# Patient Record
Sex: MALE | Race: ASIAN | NOT HISPANIC OR LATINO
[De-identification: names, ages, dates, MRNs, and addresses within clinical notes are randomized per-mention and may not be internally consistent; named-entity substitution may affect disease eponyms.]

---

## 2017-01-05 ENCOUNTER — APPOINTMENT (OUTPATIENT)
Dept: CARDIOLOGY | Facility: CLINIC | Age: 72
End: 2017-01-05

## 2017-01-05 ENCOUNTER — NON-APPOINTMENT (OUTPATIENT)
Age: 72
End: 2017-01-05

## 2017-01-05 VITALS
WEIGHT: 176 LBS | SYSTOLIC BLOOD PRESSURE: 134 MMHG | RESPIRATION RATE: 18 BRPM | BODY MASS INDEX: 29.29 KG/M2 | DIASTOLIC BLOOD PRESSURE: 88 MMHG | OXYGEN SATURATION: 97 % | HEART RATE: 102 BPM

## 2017-01-19 ENCOUNTER — NON-APPOINTMENT (OUTPATIENT)
Age: 72
End: 2017-01-19

## 2017-01-19 ENCOUNTER — APPOINTMENT (OUTPATIENT)
Dept: CARDIOLOGY | Facility: CLINIC | Age: 72
End: 2017-01-19

## 2017-01-19 VITALS
WEIGHT: 176 LBS | DIASTOLIC BLOOD PRESSURE: 79 MMHG | HEART RATE: 104 BPM | SYSTOLIC BLOOD PRESSURE: 111 MMHG | OXYGEN SATURATION: 95 % | TEMPERATURE: 98.4 F | BODY MASS INDEX: 29.29 KG/M2 | RESPIRATION RATE: 18 BRPM

## 2017-01-20 ENCOUNTER — NON-APPOINTMENT (OUTPATIENT)
Age: 72
End: 2017-01-20

## 2017-03-02 ENCOUNTER — APPOINTMENT (OUTPATIENT)
Dept: CARDIOLOGY | Facility: CLINIC | Age: 72
End: 2017-03-02

## 2017-03-02 VITALS
SYSTOLIC BLOOD PRESSURE: 150 MMHG | DIASTOLIC BLOOD PRESSURE: 96 MMHG | WEIGHT: 175 LBS | HEART RATE: 96 BPM | RESPIRATION RATE: 17 BRPM | BODY MASS INDEX: 29.12 KG/M2 | TEMPERATURE: 98.6 F | OXYGEN SATURATION: 96 %

## 2017-04-06 ENCOUNTER — APPOINTMENT (OUTPATIENT)
Dept: CARDIOLOGY | Facility: CLINIC | Age: 72
End: 2017-04-06

## 2017-04-06 VITALS
WEIGHT: 177 LBS | DIASTOLIC BLOOD PRESSURE: 90 MMHG | OXYGEN SATURATION: 96 % | RESPIRATION RATE: 18 BRPM | SYSTOLIC BLOOD PRESSURE: 144 MMHG | HEART RATE: 85 BPM | BODY MASS INDEX: 29.45 KG/M2 | TEMPERATURE: 98 F

## 2017-06-08 ENCOUNTER — APPOINTMENT (OUTPATIENT)
Dept: CARDIOLOGY | Facility: CLINIC | Age: 72
End: 2017-06-08

## 2017-06-08 VITALS
TEMPERATURE: 97.9 F | OXYGEN SATURATION: 96 % | HEART RATE: 83 BPM | DIASTOLIC BLOOD PRESSURE: 95 MMHG | SYSTOLIC BLOOD PRESSURE: 144 MMHG | WEIGHT: 175 LBS | BODY MASS INDEX: 29.12 KG/M2 | RESPIRATION RATE: 18 BRPM

## 2017-06-08 RX ORDER — CLOTRIMAZOLE 10 MG/G
1 CREAM TOPICAL
Qty: 45 | Refills: 0 | Status: ACTIVE | COMMUNITY
Start: 2017-03-08

## 2017-06-08 RX ORDER — PANTOPRAZOLE 40 MG/1
40 TABLET, DELAYED RELEASE ORAL
Qty: 30 | Refills: 0 | Status: ACTIVE | COMMUNITY
Start: 2017-01-30

## 2017-08-03 ENCOUNTER — APPOINTMENT (OUTPATIENT)
Dept: CARDIOLOGY | Facility: CLINIC | Age: 72
End: 2017-08-03
Payer: MEDICARE

## 2017-08-03 ENCOUNTER — NON-APPOINTMENT (OUTPATIENT)
Age: 72
End: 2017-08-03

## 2017-08-03 VITALS
HEART RATE: 102 BPM | WEIGHT: 175 LBS | OXYGEN SATURATION: 98 % | DIASTOLIC BLOOD PRESSURE: 92 MMHG | SYSTOLIC BLOOD PRESSURE: 158 MMHG | RESPIRATION RATE: 17 BRPM | TEMPERATURE: 98.2 F | BODY MASS INDEX: 29.12 KG/M2

## 2017-08-03 PROCEDURE — 93000 ELECTROCARDIOGRAM COMPLETE: CPT

## 2017-08-03 PROCEDURE — 99214 OFFICE O/P EST MOD 30 MIN: CPT

## 2017-10-05 ENCOUNTER — NON-APPOINTMENT (OUTPATIENT)
Age: 72
End: 2017-10-05

## 2017-10-05 ENCOUNTER — APPOINTMENT (OUTPATIENT)
Dept: CARDIOLOGY | Facility: CLINIC | Age: 72
End: 2017-10-05
Payer: MEDICARE

## 2017-10-05 VITALS
RESPIRATION RATE: 17 BRPM | BODY MASS INDEX: 29.95 KG/M2 | HEART RATE: 58 BPM | SYSTOLIC BLOOD PRESSURE: 125 MMHG | TEMPERATURE: 98.7 F | WEIGHT: 180 LBS | OXYGEN SATURATION: 96 % | DIASTOLIC BLOOD PRESSURE: 77 MMHG

## 2017-10-05 PROCEDURE — 93000 ELECTROCARDIOGRAM COMPLETE: CPT

## 2017-10-05 PROCEDURE — 99214 OFFICE O/P EST MOD 30 MIN: CPT

## 2018-03-01 ENCOUNTER — APPOINTMENT (OUTPATIENT)
Dept: CARDIOLOGY | Facility: CLINIC | Age: 73
End: 2018-03-01
Payer: MEDICARE

## 2018-03-01 VITALS
TEMPERATURE: 98 F | BODY MASS INDEX: 29.29 KG/M2 | RESPIRATION RATE: 18 BRPM | OXYGEN SATURATION: 96 % | DIASTOLIC BLOOD PRESSURE: 101 MMHG | SYSTOLIC BLOOD PRESSURE: 148 MMHG | HEART RATE: 98 BPM | WEIGHT: 176 LBS

## 2018-03-01 PROCEDURE — 93306 TTE W/DOPPLER COMPLETE: CPT

## 2018-03-01 PROCEDURE — 99215 OFFICE O/P EST HI 40 MIN: CPT

## 2018-03-01 PROCEDURE — 93224 XTRNL ECG REC UP TO 48 HRS: CPT

## 2018-03-22 ENCOUNTER — APPOINTMENT (OUTPATIENT)
Dept: CARDIOLOGY | Facility: CLINIC | Age: 73
End: 2018-03-22
Payer: MEDICARE

## 2018-03-22 VITALS
TEMPERATURE: 98.1 F | SYSTOLIC BLOOD PRESSURE: 144 MMHG | OXYGEN SATURATION: 95 % | HEART RATE: 89 BPM | BODY MASS INDEX: 29.29 KG/M2 | DIASTOLIC BLOOD PRESSURE: 93 MMHG | WEIGHT: 176 LBS | RESPIRATION RATE: 18 BRPM

## 2018-03-22 PROCEDURE — 93225 XTRNL ECG REC<48 HRS REC: CPT

## 2018-03-22 PROCEDURE — 99214 OFFICE O/P EST MOD 30 MIN: CPT

## 2018-03-22 PROCEDURE — 93227 XTRNL ECG REC<48 HR R&I: CPT

## 2018-03-22 RX ORDER — METFORMIN ER 500 MG 500 MG/1
500 TABLET ORAL
Qty: 90 | Refills: 0 | Status: ACTIVE | COMMUNITY
Start: 2018-03-21

## 2018-04-12 ENCOUNTER — APPOINTMENT (OUTPATIENT)
Dept: CARDIOLOGY | Facility: CLINIC | Age: 73
End: 2018-04-12

## 2018-05-03 ENCOUNTER — APPOINTMENT (OUTPATIENT)
Dept: CARDIOLOGY | Facility: CLINIC | Age: 73
End: 2018-05-03
Payer: MEDICARE

## 2018-05-03 VITALS
DIASTOLIC BLOOD PRESSURE: 78 MMHG | RESPIRATION RATE: 18 BRPM | HEART RATE: 94 BPM | WEIGHT: 179 LBS | SYSTOLIC BLOOD PRESSURE: 113 MMHG | OXYGEN SATURATION: 97 % | TEMPERATURE: 98.6 F | BODY MASS INDEX: 29.79 KG/M2

## 2018-05-03 PROCEDURE — 99214 OFFICE O/P EST MOD 30 MIN: CPT

## 2018-05-03 PROCEDURE — 93227 XTRNL ECG REC<48 HR R&I: CPT

## 2018-05-03 PROCEDURE — 93225 XTRNL ECG REC<48 HRS REC: CPT

## 2018-07-12 ENCOUNTER — APPOINTMENT (OUTPATIENT)
Dept: CARDIOLOGY | Facility: CLINIC | Age: 73
End: 2018-07-12
Payer: MEDICARE

## 2018-07-12 ENCOUNTER — NON-APPOINTMENT (OUTPATIENT)
Age: 73
End: 2018-07-12

## 2018-07-12 VITALS
RESPIRATION RATE: 18 BRPM | HEART RATE: 89 BPM | SYSTOLIC BLOOD PRESSURE: 165 MMHG | OXYGEN SATURATION: 96 % | TEMPERATURE: 98 F | DIASTOLIC BLOOD PRESSURE: 92 MMHG

## 2018-07-12 PROCEDURE — 93000 ELECTROCARDIOGRAM COMPLETE: CPT

## 2018-07-12 PROCEDURE — 99214 OFFICE O/P EST MOD 30 MIN: CPT

## 2018-09-13 ENCOUNTER — APPOINTMENT (OUTPATIENT)
Dept: CARDIOLOGY | Facility: CLINIC | Age: 73
End: 2018-09-13
Payer: MEDICARE

## 2018-09-13 ENCOUNTER — NON-APPOINTMENT (OUTPATIENT)
Age: 73
End: 2018-09-13

## 2018-09-13 VITALS
BODY MASS INDEX: 28.79 KG/M2 | HEART RATE: 79 BPM | SYSTOLIC BLOOD PRESSURE: 179 MMHG | RESPIRATION RATE: 18 BRPM | WEIGHT: 173 LBS | OXYGEN SATURATION: 96 % | DIASTOLIC BLOOD PRESSURE: 96 MMHG

## 2018-09-13 PROCEDURE — 99214 OFFICE O/P EST MOD 30 MIN: CPT

## 2018-09-13 PROCEDURE — 93000 ELECTROCARDIOGRAM COMPLETE: CPT

## 2018-09-13 RX ORDER — LOSARTAN POTASSIUM AND HYDROCHLOROTHIAZIDE 12.5; 1 MG/1; MG/1
100-12.5 TABLET ORAL
Qty: 90 | Refills: 0 | Status: DISCONTINUED | COMMUNITY
Start: 2017-03-02 | End: 2018-09-13

## 2018-09-13 RX ORDER — METOPROLOL SUCCINATE 50 MG/1
50 TABLET, EXTENDED RELEASE ORAL DAILY
Qty: 90 | Refills: 2 | Status: ACTIVE | COMMUNITY
Start: 2017-01-05 | End: 1900-01-01

## 2018-09-13 RX ORDER — METOPROLOL SUCCINATE 25 MG/1
25 TABLET, EXTENDED RELEASE ORAL
Qty: 30 | Refills: 0 | Status: DISCONTINUED | COMMUNITY
Start: 2017-01-05 | End: 2018-09-13

## 2018-11-15 ENCOUNTER — APPOINTMENT (OUTPATIENT)
Dept: CARDIOLOGY | Facility: CLINIC | Age: 73
End: 2018-11-15
Payer: MEDICARE

## 2018-11-15 VITALS
BODY MASS INDEX: 29.29 KG/M2 | WEIGHT: 176 LBS | RESPIRATION RATE: 17 BRPM | HEART RATE: 80 BPM | OXYGEN SATURATION: 96 % | DIASTOLIC BLOOD PRESSURE: 84 MMHG | SYSTOLIC BLOOD PRESSURE: 128 MMHG | TEMPERATURE: 98.3 F

## 2018-11-15 PROCEDURE — 93000 ELECTROCARDIOGRAM COMPLETE: CPT

## 2018-11-15 PROCEDURE — 99214 OFFICE O/P EST MOD 30 MIN: CPT

## 2018-11-15 NOTE — HISTORY OF PRESENT ILLNESS
[FreeTextEntry1] : 1. HTN: on medications, compliant with medications. \par 2. Hyperlipidemia: on statin, no muscle pain is noted. \par 3. AFib: diagnosed in 4/2016. He initially had LVEF 35% but after rate control, his LVEF was normal on last visit.\par He is compliant with medications. Palpitations mild but improved.\par ET is good. No CP.

## 2018-11-15 NOTE — PHYSICAL EXAM
[General Appearance - Well Developed] : well developed [Normal Appearance] : normal appearance [Well Groomed] : well groomed [General Appearance - Well Nourished] : well nourished [No Deformities] : no deformities [General Appearance - In No Acute Distress] : no acute distress [Normal Conjunctiva] : the conjunctiva exhibited no abnormalities [Eyelids - No Xanthelasma] : the eyelids demonstrated no xanthelasmas [Normal Oral Mucosa] : normal oral mucosa [No Oral Pallor] : no oral pallor [No Oral Cyanosis] : no oral cyanosis [Normal Jugular Venous A Waves Present] : normal jugular venous A waves present [Normal Jugular Venous V Waves Present] : normal jugular venous V waves present [No Jugular Venous Odom A Waves] : no jugular venous odom A waves [Respiration, Rhythm And Depth] : normal respiratory rhythm and effort [Exaggerated Use Of Accessory Muscles For Inspiration] : no accessory muscle use [Auscultation Breath Sounds / Voice Sounds] : lungs were clear to auscultation bilaterally [FreeTextEntry1] : irreg irreg S1 S2  3/6 GAVI DANETTEB [Abdomen Soft] : soft [Abdomen Tenderness] : non-tender [Abdomen Mass (___ Cm)] : no abdominal mass palpated [Abnormal Walk] : normal gait [Nail Clubbing] : no clubbing of the fingernails [Cyanosis, Localized] : no localized cyanosis [Petechial Hemorrhages (___cm)] : no petechial hemorrhages [Skin Color & Pigmentation] : normal skin color and pigmentation [] : no rash [No Venous Stasis] : no venous stasis [Skin Lesions] : no skin lesions [No Skin Ulcers] : no skin ulcer [No Xanthoma] : no  xanthoma was observed [Oriented To Time, Place, And Person] : oriented to person, place, and time [Affect] : the affect was normal [Mood] : the mood was normal [No Anxiety] : not feeling anxious

## 2018-11-15 NOTE — REVIEW OF SYSTEMS
[Shortness Of Breath] : shortness of breath [Dyspnea on exertion] : not dyspnea during exertion [Chest Pain] : no chest pain [Lower Ext Edema] : no extremity edema [Palpitations] : palpitations [Negative] : Heme/Lymph

## 2018-11-15 NOTE — DISCUSSION/SUMMARY
[FreeTextEntry1] : 73 M HTN hyperlipidemia AF for f/u.\par Continue meds for HTN.\par Continue statin.\par Continue AC and BB.

## 2018-11-15 NOTE — REASON FOR VISIT
[Follow-Up - Clinic] : a clinic follow-up of [Atrial Fibrillation] : atrial fibrillation [Hyperlipidemia] : hyperlipidemia [Hypertension] : hypertension [FreeTextEntry1] : 73 M HTN hyperlipidemia AFib with palpitations.

## 2019-02-14 ENCOUNTER — APPOINTMENT (OUTPATIENT)
Dept: CARDIOLOGY | Facility: CLINIC | Age: 74
End: 2019-02-14
Payer: MEDICARE

## 2019-02-14 VITALS
HEART RATE: 72 BPM | TEMPERATURE: 98.2 F | OXYGEN SATURATION: 98 % | SYSTOLIC BLOOD PRESSURE: 139 MMHG | RESPIRATION RATE: 18 BRPM | DIASTOLIC BLOOD PRESSURE: 91 MMHG

## 2019-02-14 PROCEDURE — 93306 TTE W/DOPPLER COMPLETE: CPT

## 2019-02-14 PROCEDURE — 93224 XTRNL ECG REC UP TO 48 HRS: CPT

## 2019-02-14 PROCEDURE — 99215 OFFICE O/P EST HI 40 MIN: CPT

## 2019-02-14 PROCEDURE — 93224 XTRNL ECG REC UP TO 48 HRS: CPT | Mod: 59

## 2019-02-14 PROCEDURE — 93000 ELECTROCARDIOGRAM COMPLETE: CPT | Mod: 59

## 2019-02-14 NOTE — REASON FOR VISIT
[Follow-Up - Clinic] : a clinic follow-up of [Atrial Fibrillation] : atrial fibrillation [Hyperlipidemia] : hyperlipidemia [Hypertension] : hypertension [Dyspnea] : dyspnea [Palpitations] : palpitations [FreeTextEntry1] : 73 M HTN hyperlipidemia AFib with palpitations. \par

## 2019-02-14 NOTE — HISTORY OF PRESENT ILLNESS
[FreeTextEntry1] : 1. HTN: on medications, BP control improved.\par 2. Hyperlipidemia: on statin, no muscle pain is noted. LIpid profile is followed by PMD.\par 3. AFib: diagnosed in 4/2016. He initially had LVEF 35% but after rate control, his LVEF was normal on last visit.\par He returns wto clinic with worsening SOB over 2 months. SOB is exertional, relieved with rest and increasing in severity with mild leg edema. It occurs daily and limits his ET. He also has noted increasing palpitations with exertion. He denies CP. ET is limited by the SOB.

## 2019-02-14 NOTE — DISCUSSION/SUMMARY
[FreeTextEntry1] : 73 M HTN hyperlipidemia CAD s/p PCI AF with SOB and palpitations.\par CHECK ECHOCARDIOGRAM.\par CHECK HOLTER MONITOR.\par CHECK NST TO ASSESS PERFUSION (limited ET).\par Continue meds for HTN and hyperlipidemia.\par COndition deteriorating, see me after tests.

## 2019-02-21 ENCOUNTER — NON-APPOINTMENT (OUTPATIENT)
Age: 74
End: 2019-02-21

## 2019-03-07 ENCOUNTER — APPOINTMENT (OUTPATIENT)
Dept: CARDIOLOGY | Facility: CLINIC | Age: 74
End: 2019-03-07
Payer: MEDICARE

## 2019-03-07 VITALS
HEART RATE: 78 BPM | RESPIRATION RATE: 18 BRPM | SYSTOLIC BLOOD PRESSURE: 143 MMHG | TEMPERATURE: 98.6 F | OXYGEN SATURATION: 98 % | DIASTOLIC BLOOD PRESSURE: 95 MMHG | WEIGHT: 178 LBS | BODY MASS INDEX: 29.62 KG/M2

## 2019-03-07 PROCEDURE — 99214 OFFICE O/P EST MOD 30 MIN: CPT

## 2019-03-07 RX ORDER — ESCITALOPRAM OXALATE 5 MG/1
5 TABLET ORAL
Qty: 30 | Refills: 0 | Status: ACTIVE | COMMUNITY
Start: 2018-12-26

## 2019-03-07 RX ORDER — ATORVASTATIN CALCIUM 20 MG/1
20 TABLET, FILM COATED ORAL
Qty: 90 | Refills: 0 | Status: ACTIVE | COMMUNITY
Start: 2018-06-25

## 2019-03-07 RX ORDER — PRAZOSIN HYDROCHLORIDE 1 MG/1
1 CAPSULE ORAL
Qty: 30 | Refills: 0 | Status: ACTIVE | COMMUNITY
Start: 2018-12-26

## 2019-03-07 RX ORDER — HYDROCORTISONE AND ACETIC ACID OTIC 20.75; 10.375 MG/ML; MG/ML
1-2 SOLUTION AURICULAR (OTIC)
Qty: 10 | Refills: 0 | Status: ACTIVE | COMMUNITY
Start: 2019-01-24

## 2019-03-07 NOTE — HISTORY OF PRESENT ILLNESS
[FreeTextEntry1] : 1. HTN: on medications, BP control improved.\par 2. Hyperlipidemia: on statin, no muscle pain is noted. LIpid profile is followed by PMD.\par 3. AFib: diagnosed in 4/2016. He initially had LVEF 35% but after rate control, his LVEF was normal on last visit.\par He returns wto clinic with worsening SOB over 2 months. SOB is exertional, relieved with rest and increasing in severity with mild leg edema. It occurs daily and limits his ET. He also has noted increasing palpitations with exertion. He denies CP. ET is limited by the SOB. \par He had an echo last visit that showed LVEF 60%. He has PVCs and tachybrady on Holter.\par

## 2019-03-07 NOTE — DISCUSSION/SUMMARY
[FreeTextEntry1] : 74 M HTN hyperlipidemia AF with SOB.\par Echo and holter results reviewed.\par CHECK NST TO ASSES PERFUSION.\par Continue meds for HTN.\par Continue statin.\par Continue AC and rate control.

## 2019-03-12 ENCOUNTER — EMERGENCY (EMERGENCY)
Facility: HOSPITAL | Age: 74
LOS: 1 days | Discharge: HOME | End: 2019-03-12
Attending: EMERGENCY MEDICINE | Admitting: EMERGENCY MEDICINE
Payer: MEDICARE

## 2019-03-12 VITALS
HEART RATE: 130 BPM | TEMPERATURE: 97 F | DIASTOLIC BLOOD PRESSURE: 74 MMHG | SYSTOLIC BLOOD PRESSURE: 111 MMHG | OXYGEN SATURATION: 96 % | RESPIRATION RATE: 16 BRPM

## 2019-03-12 PROCEDURE — 99285 EMERGENCY DEPT VISIT HI MDM: CPT | Mod: 25,GC

## 2019-03-12 PROCEDURE — 93010 ELECTROCARDIOGRAM REPORT: CPT

## 2019-03-12 RX ORDER — SODIUM CHLORIDE 9 MG/ML
1000 INJECTION INTRAMUSCULAR; INTRAVENOUS; SUBCUTANEOUS ONCE
Qty: 0 | Refills: 0 | Status: COMPLETED | OUTPATIENT
Start: 2019-03-12 | End: 2019-03-12

## 2019-03-12 RX ORDER — MORPHINE SULFATE 50 MG/1
4 CAPSULE, EXTENDED RELEASE ORAL ONCE
Qty: 0 | Refills: 0 | Status: DISCONTINUED | OUTPATIENT
Start: 2019-03-12 | End: 2019-03-12

## 2019-03-12 RX ADMIN — MORPHINE SULFATE 4 MILLIGRAM(S): 50 CAPSULE, EXTENDED RELEASE ORAL at 23:38

## 2019-03-12 RX ADMIN — SODIUM CHLORIDE 1000 MILLILITER(S): 9 INJECTION INTRAMUSCULAR; INTRAVENOUS; SUBCUTANEOUS at 23:52

## 2019-03-12 NOTE — ED ADULT TRIAGE NOTE - CHIEF COMPLAINT QUOTE
As per Son he as Kidney stones...pain started on left side yesterday has blood in urine....he saw PMD 1 month ago told he has a 1 cm stone. Pt appears very uncomfortable.

## 2019-03-12 NOTE — ED PROVIDER NOTE - PROGRESS NOTE DETAILS
Alonso: s/o to Dr Whitmore to f/u labs and ct and vs.  stable.  working dx kidney stone r/o renal impairment.  likely will need uro if large stone with hx of lithotrypsy and time frame CT showed that pt had passed the stone, BMP showed mild PAIGE, Pt was given 2 L of IVF. Pt was reassessed, symptoms have improved, UA is unremarkable. Pt is stable and will be discharged to follow up outpatient with his urologist for repeat BMP and further management.

## 2019-03-12 NOTE — ED PROVIDER NOTE - OBJECTIVE STATEMENT
74 yr old male with hx of afib on xarelto, HTN, HLD, DM, kidneys tone of left x 3 requiring lithotripsy/stent presents to ed c/o left flank pain  worsening x 1 day with hematuria and dysuria.  dx with stone 1 month ago and told 1cm  stone.  no fever, no n/v, no cp. pt feels abd fullness. took motirn at 7p.

## 2019-03-12 NOTE — ED PROVIDER NOTE - NSFOLLOWUPINSTRUCTIONS_ED_ALL_ED_FT
Please, follow up with your urologist for repeat blood work to evaluate kidney function and further management.   Please take over the counter pain medications, such as Motrin or Tylenol, as needed.

## 2019-03-13 VITALS
RESPIRATION RATE: 18 BRPM | OXYGEN SATURATION: 100 % | DIASTOLIC BLOOD PRESSURE: 92 MMHG | SYSTOLIC BLOOD PRESSURE: 145 MMHG | HEART RATE: 75 BPM | TEMPERATURE: 96 F

## 2019-03-13 LAB
ALBUMIN SERPL ELPH-MCNC: 4 G/DL — SIGNIFICANT CHANGE UP (ref 3.3–5)
ALP SERPL-CCNC: 60 U/L — SIGNIFICANT CHANGE UP (ref 40–120)
ALT FLD-CCNC: 11 U/L — SIGNIFICANT CHANGE UP (ref 4–41)
ANION GAP SERPL CALC-SCNC: 16 MMO/L — HIGH (ref 7–14)
APPEARANCE UR: SIGNIFICANT CHANGE UP
APTT BLD: 38.9 SEC — HIGH (ref 27.5–36.3)
AST SERPL-CCNC: 16 U/L — SIGNIFICANT CHANGE UP (ref 4–40)
BACTERIA # UR AUTO: NEGATIVE — SIGNIFICANT CHANGE UP
BASE EXCESS BLDV CALC-SCNC: -1.4 MMOL/L — SIGNIFICANT CHANGE UP
BASOPHILS # BLD AUTO: 0.05 K/UL — SIGNIFICANT CHANGE UP (ref 0–0.2)
BASOPHILS NFR BLD AUTO: 0.4 % — SIGNIFICANT CHANGE UP (ref 0–2)
BILIRUB SERPL-MCNC: 1 MG/DL — SIGNIFICANT CHANGE UP (ref 0.2–1.2)
BILIRUB UR-MCNC: NEGATIVE — SIGNIFICANT CHANGE UP
BLD GP AB SCN SERPL QL: NEGATIVE — SIGNIFICANT CHANGE UP
BLOOD GAS VENOUS - CREATININE: 1.31 MG/DL — HIGH (ref 0.5–1.3)
BLOOD UR QL VISUAL: HIGH
BUN SERPL-MCNC: 21 MG/DL — SIGNIFICANT CHANGE UP (ref 7–23)
CALCIUM SERPL-MCNC: 9.8 MG/DL — SIGNIFICANT CHANGE UP (ref 8.4–10.5)
CHLORIDE BLDV-SCNC: 108 MMOL/L — SIGNIFICANT CHANGE UP (ref 96–108)
CHLORIDE SERPL-SCNC: 100 MMOL/L — SIGNIFICANT CHANGE UP (ref 98–107)
CO2 SERPL-SCNC: 20 MMOL/L — LOW (ref 22–31)
COLOR SPEC: SIGNIFICANT CHANGE UP
CREAT SERPL-MCNC: 1.46 MG/DL — HIGH (ref 0.5–1.3)
EOSINOPHIL # BLD AUTO: 0.07 K/UL — SIGNIFICANT CHANGE UP (ref 0–0.5)
EOSINOPHIL NFR BLD AUTO: 0.6 % — SIGNIFICANT CHANGE UP (ref 0–6)
GAS PNL BLDV: 132 MMOL/L — LOW (ref 136–146)
GLUCOSE BLDV-MCNC: 129 — HIGH (ref 70–99)
GLUCOSE SERPL-MCNC: 132 MG/DL — HIGH (ref 70–99)
GLUCOSE UR-MCNC: NEGATIVE — SIGNIFICANT CHANGE UP
HCO3 BLDV-SCNC: 23 MMOL/L — SIGNIFICANT CHANGE UP (ref 20–27)
HCT VFR BLD CALC: 46.5 % — SIGNIFICANT CHANGE UP (ref 39–50)
HCT VFR BLDV CALC: 47.3 % — SIGNIFICANT CHANGE UP (ref 39–51)
HGB BLD-MCNC: 15.3 G/DL — SIGNIFICANT CHANGE UP (ref 13–17)
HGB BLDV-MCNC: 15.5 G/DL — SIGNIFICANT CHANGE UP (ref 13–17)
HYALINE CASTS # UR AUTO: SIGNIFICANT CHANGE UP
IMM GRANULOCYTES NFR BLD AUTO: 0.3 % — SIGNIFICANT CHANGE UP (ref 0–1.5)
INR BLD: 2.16 — HIGH (ref 0.88–1.17)
KETONES UR-MCNC: NEGATIVE — SIGNIFICANT CHANGE UP
LACTATE BLDV-MCNC: 1.8 MMOL/L — SIGNIFICANT CHANGE UP (ref 0.5–2)
LEUKOCYTE ESTERASE UR-ACNC: NEGATIVE — SIGNIFICANT CHANGE UP
LYMPHOCYTES # BLD AUTO: 1.37 K/UL — SIGNIFICANT CHANGE UP (ref 1–3.3)
LYMPHOCYTES # BLD AUTO: 12 % — LOW (ref 13–44)
MCHC RBC-ENTMCNC: 31.4 PG — SIGNIFICANT CHANGE UP (ref 27–34)
MCHC RBC-ENTMCNC: 32.9 % — SIGNIFICANT CHANGE UP (ref 32–36)
MCV RBC AUTO: 95.3 FL — SIGNIFICANT CHANGE UP (ref 80–100)
MONOCYTES # BLD AUTO: 1.16 K/UL — HIGH (ref 0–0.9)
MONOCYTES NFR BLD AUTO: 10.2 % — SIGNIFICANT CHANGE UP (ref 2–14)
NEUTROPHILS # BLD AUTO: 8.73 K/UL — HIGH (ref 1.8–7.4)
NEUTROPHILS NFR BLD AUTO: 76.5 % — SIGNIFICANT CHANGE UP (ref 43–77)
NITRITE UR-MCNC: NEGATIVE — SIGNIFICANT CHANGE UP
NRBC # FLD: 0 K/UL — LOW (ref 25–125)
PCO2 BLDV: 39 MMHG — LOW (ref 41–51)
PH BLDV: 7.39 PH — SIGNIFICANT CHANGE UP (ref 7.32–7.43)
PH UR: 6 — SIGNIFICANT CHANGE UP (ref 5–8)
PLATELET # BLD AUTO: 280 K/UL — SIGNIFICANT CHANGE UP (ref 150–400)
PMV BLD: 10.6 FL — SIGNIFICANT CHANGE UP (ref 7–13)
PO2 BLDV: 64 MMHG — HIGH (ref 35–40)
POTASSIUM BLDV-SCNC: 3.8 MMOL/L — SIGNIFICANT CHANGE UP (ref 3.4–4.5)
POTASSIUM SERPL-MCNC: 4.1 MMOL/L — SIGNIFICANT CHANGE UP (ref 3.5–5.3)
POTASSIUM SERPL-SCNC: 4.1 MMOL/L — SIGNIFICANT CHANGE UP (ref 3.5–5.3)
PROT SERPL-MCNC: 7.1 G/DL — SIGNIFICANT CHANGE UP (ref 6–8.3)
PROT UR-MCNC: 70 — SIGNIFICANT CHANGE UP
PROTHROM AB SERPL-ACNC: 24.6 SEC — HIGH (ref 9.8–13.1)
RBC # BLD: 4.88 M/UL — SIGNIFICANT CHANGE UP (ref 4.2–5.8)
RBC # FLD: 12.3 % — SIGNIFICANT CHANGE UP (ref 10.3–14.5)
RBC CASTS # UR COMP ASSIST: >50 — HIGH (ref 0–?)
RH IG SCN BLD-IMP: POSITIVE — SIGNIFICANT CHANGE UP
SAO2 % BLDV: 91.6 % — HIGH (ref 60–85)
SODIUM SERPL-SCNC: 136 MMOL/L — SIGNIFICANT CHANGE UP (ref 135–145)
SP GR SPEC: 1.01 — SIGNIFICANT CHANGE UP (ref 1–1.04)
SQUAMOUS # UR AUTO: SIGNIFICANT CHANGE UP
UROBILINOGEN FLD QL: NORMAL — SIGNIFICANT CHANGE UP
WBC # BLD: 11.41 K/UL — HIGH (ref 3.8–10.5)
WBC # FLD AUTO: 11.41 K/UL — HIGH (ref 3.8–10.5)
WBC UR QL: HIGH (ref 0–?)

## 2019-03-13 PROCEDURE — 74176 CT ABD & PELVIS W/O CONTRAST: CPT | Mod: 26,GC

## 2019-03-13 RX ORDER — KETOROLAC TROMETHAMINE 30 MG/ML
30 SYRINGE (ML) INJECTION ONCE
Qty: 0 | Refills: 0 | Status: DISCONTINUED | OUTPATIENT
Start: 2019-03-13 | End: 2019-03-13

## 2019-03-13 RX ADMIN — Medication 30 MILLIGRAM(S): at 03:32

## 2019-03-13 RX ADMIN — SODIUM CHLORIDE 1000 MILLILITER(S): 9 INJECTION INTRAMUSCULAR; INTRAVENOUS; SUBCUTANEOUS at 00:57

## 2019-03-13 RX ADMIN — MORPHINE SULFATE 4 MILLIGRAM(S): 50 CAPSULE, EXTENDED RELEASE ORAL at 00:00

## 2019-03-13 NOTE — ED ADULT NURSE NOTE - OBJECTIVE STATEMENT
pt received to room B. Turkish speaking, requests for son translate. c/o left pain since yesterday with hematuria/dysuria. hx kidney stones in the past. denies any fever/chills, n/v, dizziness, chest pain. appears uncomfortable on arrival to room. labs sent, iv accessed, medicated as ordered. awaits results in nad with family at bedside.

## 2019-03-13 NOTE — ED ADULT NURSE NOTE - RELATIONSHIP TO PATIENT
son Trilobed Flap Text: The defect edges were debeveled with a #15 scalpel blade.  Given the location of the defect and the proximity to free margins a trilobed flap was deemed most appropriate.  Using a sterile surgical marker, an appropriate trilobed flap drawn around the defect.    The area thus outlined was incised deep to adipose tissue with a #15 scalpel blade.  The skin margins were undermined to an appropriate distance in all directions utilizing iris scissors.

## 2019-03-14 LAB
BACTERIA UR CULT: SIGNIFICANT CHANGE UP
SPECIMEN SOURCE: SIGNIFICANT CHANGE UP

## 2019-04-26 ENCOUNTER — APPOINTMENT (OUTPATIENT)
Dept: CARDIOLOGY | Facility: CLINIC | Age: 74
End: 2019-04-26

## 2019-05-31 ENCOUNTER — EMERGENCY (EMERGENCY)
Facility: HOSPITAL | Age: 74
LOS: 1 days | Discharge: ANOTHER HOSPITAL | End: 2019-05-31
Attending: EMERGENCY MEDICINE | Admitting: EMERGENCY MEDICINE
Payer: MEDICARE

## 2019-05-31 ENCOUNTER — INPATIENT (INPATIENT)
Facility: HOSPITAL | Age: 74
LOS: 4 days | DRG: 64 | End: 2019-06-05
Attending: PSYCHIATRY & NEUROLOGY | Admitting: PSYCHIATRY & NEUROLOGY
Payer: MEDICARE

## 2019-05-31 VITALS
RESPIRATION RATE: 20 BRPM | OXYGEN SATURATION: 96 % | DIASTOLIC BLOOD PRESSURE: 117 MMHG | TEMPERATURE: 98 F | SYSTOLIC BLOOD PRESSURE: 158 MMHG | HEART RATE: 86 BPM

## 2019-05-31 VITALS
TEMPERATURE: 98 F | OXYGEN SATURATION: 98 % | SYSTOLIC BLOOD PRESSURE: 149 MMHG | HEART RATE: 96 BPM | RESPIRATION RATE: 20 BRPM | DIASTOLIC BLOOD PRESSURE: 81 MMHG

## 2019-05-31 VITALS
TEMPERATURE: 98 F | SYSTOLIC BLOOD PRESSURE: 148 MMHG | RESPIRATION RATE: 16 BRPM | HEART RATE: 85 BPM | OXYGEN SATURATION: 100 % | DIASTOLIC BLOOD PRESSURE: 97 MMHG

## 2019-05-31 DIAGNOSIS — I63.511 CEREBRAL INFARCTION DUE TO UNSPECIFIED OCCLUSION OR STENOSIS OF RIGHT MIDDLE CEREBRAL ARTERY: ICD-10-CM

## 2019-05-31 PROBLEM — I10 ESSENTIAL (PRIMARY) HYPERTENSION: Chronic | Status: ACTIVE | Noted: 2019-03-13

## 2019-05-31 PROBLEM — E11.9 TYPE 2 DIABETES MELLITUS WITHOUT COMPLICATIONS: Chronic | Status: ACTIVE | Noted: 2019-03-13

## 2019-05-31 PROBLEM — I48.91 UNSPECIFIED ATRIAL FIBRILLATION: Chronic | Status: ACTIVE | Noted: 2019-03-13

## 2019-05-31 LAB
ALBUMIN SERPL ELPH-MCNC: 3.8 G/DL — SIGNIFICANT CHANGE UP (ref 3.3–5)
ALBUMIN SERPL ELPH-MCNC: 4 G/DL — SIGNIFICANT CHANGE UP (ref 3.3–5)
ALP SERPL-CCNC: 56 U/L — SIGNIFICANT CHANGE UP (ref 40–120)
ALP SERPL-CCNC: 60 U/L — SIGNIFICANT CHANGE UP (ref 40–120)
ALT FLD-CCNC: 12 U/L — SIGNIFICANT CHANGE UP (ref 4–41)
ALT FLD-CCNC: 16 U/L — SIGNIFICANT CHANGE UP (ref 10–45)
ANION GAP SERPL CALC-SCNC: 10 MMOL/L — SIGNIFICANT CHANGE UP (ref 5–17)
ANION GAP SERPL CALC-SCNC: 11 MMO/L — SIGNIFICANT CHANGE UP (ref 7–14)
ANION GAP SERPL CALC-SCNC: 14 MMOL/L — SIGNIFICANT CHANGE UP (ref 5–17)
APTT BLD: 26.4 SEC — LOW (ref 27.5–36.3)
APTT BLD: 28.4 SEC — SIGNIFICANT CHANGE UP (ref 27.5–36.3)
AST SERPL-CCNC: 22 U/L — SIGNIFICANT CHANGE UP (ref 10–40)
AST SERPL-CCNC: 24 U/L — SIGNIFICANT CHANGE UP (ref 4–40)
BASE EXCESS BLDV CALC-SCNC: 1.4 MMOL/L — SIGNIFICANT CHANGE UP
BASOPHILS # BLD AUTO: 0 K/UL — SIGNIFICANT CHANGE UP (ref 0–0.2)
BASOPHILS # BLD AUTO: 0.04 K/UL — SIGNIFICANT CHANGE UP (ref 0–0.2)
BASOPHILS NFR BLD AUTO: 0.3 % — SIGNIFICANT CHANGE UP (ref 0–2)
BASOPHILS NFR BLD AUTO: 0.5 % — SIGNIFICANT CHANGE UP (ref 0–2)
BILIRUB SERPL-MCNC: 1.3 MG/DL — HIGH (ref 0.2–1.2)
BILIRUB SERPL-MCNC: 1.6 MG/DL — HIGH (ref 0.2–1.2)
BLD GP AB SCN SERPL QL: NEGATIVE — SIGNIFICANT CHANGE UP
BLD GP AB SCN SERPL QL: NEGATIVE — SIGNIFICANT CHANGE UP
BLOOD GAS VENOUS - CREATININE: 0.97 MG/DL — SIGNIFICANT CHANGE UP (ref 0.5–1.3)
BUN SERPL-MCNC: 16 MG/DL — SIGNIFICANT CHANGE UP (ref 7–23)
BUN SERPL-MCNC: 19 MG/DL — SIGNIFICANT CHANGE UP (ref 7–23)
BUN SERPL-MCNC: 20 MG/DL — SIGNIFICANT CHANGE UP (ref 7–23)
CALCIUM SERPL-MCNC: 8.8 MG/DL — SIGNIFICANT CHANGE UP (ref 8.4–10.5)
CALCIUM SERPL-MCNC: 9.2 MG/DL — SIGNIFICANT CHANGE UP (ref 8.4–10.5)
CALCIUM SERPL-MCNC: 9.5 MG/DL — SIGNIFICANT CHANGE UP (ref 8.4–10.5)
CHLORIDE BLDV-SCNC: 105 MMOL/L — SIGNIFICANT CHANGE UP (ref 96–108)
CHLORIDE SERPL-SCNC: 100 MMOL/L — SIGNIFICANT CHANGE UP (ref 96–108)
CHLORIDE SERPL-SCNC: 101 MMOL/L — SIGNIFICANT CHANGE UP (ref 96–108)
CHLORIDE SERPL-SCNC: 104 MMOL/L — SIGNIFICANT CHANGE UP (ref 98–107)
CK MB CFR SERPL CALC: 1.5 NG/ML — SIGNIFICANT CHANGE UP (ref 0–6.7)
CK SERPL-CCNC: 98 U/L — SIGNIFICANT CHANGE UP (ref 30–200)
CO2 SERPL-SCNC: 23 MMOL/L — SIGNIFICANT CHANGE UP (ref 22–31)
CO2 SERPL-SCNC: 23 MMOL/L — SIGNIFICANT CHANGE UP (ref 22–31)
CO2 SERPL-SCNC: 24 MMOL/L — SIGNIFICANT CHANGE UP (ref 22–31)
CREAT SERPL-MCNC: 0.89 MG/DL — SIGNIFICANT CHANGE UP (ref 0.5–1.3)
CREAT SERPL-MCNC: 0.96 MG/DL — SIGNIFICANT CHANGE UP (ref 0.5–1.3)
CREAT SERPL-MCNC: 0.96 MG/DL — SIGNIFICANT CHANGE UP (ref 0.5–1.3)
EOSINOPHIL # BLD AUTO: 0 K/UL — SIGNIFICANT CHANGE UP (ref 0–0.5)
EOSINOPHIL # BLD AUTO: 0.04 K/UL — SIGNIFICANT CHANGE UP (ref 0–0.5)
EOSINOPHIL NFR BLD AUTO: 0.4 % — SIGNIFICANT CHANGE UP (ref 0–6)
EOSINOPHIL NFR BLD AUTO: 0.5 % — SIGNIFICANT CHANGE UP (ref 0–6)
GAS PNL BLDV: 138 MMOL/L — SIGNIFICANT CHANGE UP (ref 136–146)
GLUCOSE BLDV-MCNC: 175 MG/DL — HIGH (ref 70–99)
GLUCOSE SERPL-MCNC: 157 MG/DL — HIGH (ref 70–99)
GLUCOSE SERPL-MCNC: 159 MG/DL — HIGH (ref 70–99)
GLUCOSE SERPL-MCNC: 176 MG/DL — HIGH (ref 70–99)
HCO3 BLDV-SCNC: 25 MMOL/L — SIGNIFICANT CHANGE UP (ref 20–27)
HCT VFR BLD CALC: 45.4 % — SIGNIFICANT CHANGE UP (ref 39–50)
HCT VFR BLD CALC: 47.1 % — SIGNIFICANT CHANGE UP (ref 39–50)
HCT VFR BLDV CALC: 47.6 % — SIGNIFICANT CHANGE UP (ref 39–51)
HGB BLD-MCNC: 15.1 G/DL — SIGNIFICANT CHANGE UP (ref 13–17)
HGB BLD-MCNC: 16 G/DL — SIGNIFICANT CHANGE UP (ref 13–17)
HGB BLDV-MCNC: 15.5 G/DL — SIGNIFICANT CHANGE UP (ref 13–17)
IMM GRANULOCYTES NFR BLD AUTO: 0.3 % — SIGNIFICANT CHANGE UP (ref 0–1.5)
INR BLD: 0.95 RATIO — SIGNIFICANT CHANGE UP (ref 0.88–1.16)
INR BLD: 0.97 — SIGNIFICANT CHANGE UP (ref 0.88–1.17)
LACTATE BLDV-MCNC: 2.2 MMOL/L — HIGH (ref 0.5–2)
LYMPHOCYTES # BLD AUTO: 1.04 K/UL — SIGNIFICANT CHANGE UP (ref 1–3.3)
LYMPHOCYTES # BLD AUTO: 1.1 K/UL — SIGNIFICANT CHANGE UP (ref 1–3.3)
LYMPHOCYTES # BLD AUTO: 10.5 % — LOW (ref 13–44)
LYMPHOCYTES # BLD AUTO: 13.8 % — SIGNIFICANT CHANGE UP (ref 13–44)
MCHC RBC-ENTMCNC: 30.9 PG — SIGNIFICANT CHANGE UP (ref 27–34)
MCHC RBC-ENTMCNC: 32.4 PG — SIGNIFICANT CHANGE UP (ref 27–34)
MCHC RBC-ENTMCNC: 33.3 % — SIGNIFICANT CHANGE UP (ref 32–36)
MCHC RBC-ENTMCNC: 34 GM/DL — SIGNIFICANT CHANGE UP (ref 32–36)
MCV RBC AUTO: 92.8 FL — SIGNIFICANT CHANGE UP (ref 80–100)
MCV RBC AUTO: 95.4 FL — SIGNIFICANT CHANGE UP (ref 80–100)
MONOCYTES # BLD AUTO: 0.48 K/UL — SIGNIFICANT CHANGE UP (ref 0–0.9)
MONOCYTES # BLD AUTO: 0.6 K/UL — SIGNIFICANT CHANGE UP (ref 0–0.9)
MONOCYTES NFR BLD AUTO: 5.3 % — SIGNIFICANT CHANGE UP (ref 2–14)
MONOCYTES NFR BLD AUTO: 6.4 % — SIGNIFICANT CHANGE UP (ref 2–14)
NEUTROPHILS # BLD AUTO: 5.91 K/UL — SIGNIFICANT CHANGE UP (ref 1.8–7.4)
NEUTROPHILS # BLD AUTO: 8.7 K/UL — HIGH (ref 1.8–7.4)
NEUTROPHILS NFR BLD AUTO: 78.5 % — HIGH (ref 43–77)
NEUTROPHILS NFR BLD AUTO: 83.4 % — HIGH (ref 43–77)
NRBC # FLD: 0 K/UL — SIGNIFICANT CHANGE UP (ref 0–0)
PCO2 BLDV: 45 MMHG — SIGNIFICANT CHANGE UP (ref 41–51)
PH BLDV: 7.38 PH — SIGNIFICANT CHANGE UP (ref 7.32–7.43)
PLATELET # BLD AUTO: 232 K/UL — SIGNIFICANT CHANGE UP (ref 150–400)
PLATELET # BLD AUTO: 250 K/UL — SIGNIFICANT CHANGE UP (ref 150–400)
PMV BLD: 10.1 FL — SIGNIFICANT CHANGE UP (ref 7–13)
PO2 BLDV: 55 MMHG — HIGH (ref 35–40)
POTASSIUM BLDV-SCNC: 4.4 MMOL/L — SIGNIFICANT CHANGE UP (ref 3.4–4.5)
POTASSIUM SERPL-MCNC: 4.4 MMOL/L — SIGNIFICANT CHANGE UP (ref 3.5–5.3)
POTASSIUM SERPL-MCNC: 4.4 MMOL/L — SIGNIFICANT CHANGE UP (ref 3.5–5.3)
POTASSIUM SERPL-MCNC: 5.2 MMOL/L — SIGNIFICANT CHANGE UP (ref 3.5–5.3)
POTASSIUM SERPL-SCNC: 4.4 MMOL/L — SIGNIFICANT CHANGE UP (ref 3.5–5.3)
POTASSIUM SERPL-SCNC: 4.4 MMOL/L — SIGNIFICANT CHANGE UP (ref 3.5–5.3)
POTASSIUM SERPL-SCNC: 5.2 MMOL/L — SIGNIFICANT CHANGE UP (ref 3.5–5.3)
PROT SERPL-MCNC: 6.9 G/DL — SIGNIFICANT CHANGE UP (ref 6–8.3)
PROT SERPL-MCNC: 7.2 G/DL — SIGNIFICANT CHANGE UP (ref 6–8.3)
PROTHROM AB SERPL-ACNC: 10.8 SEC — SIGNIFICANT CHANGE UP (ref 10–12.9)
PROTHROM AB SERPL-ACNC: 11.1 SEC — SIGNIFICANT CHANGE UP (ref 9.8–13.1)
RBC # BLD: 4.89 M/UL — SIGNIFICANT CHANGE UP (ref 4.2–5.8)
RBC # BLD: 4.94 M/UL — SIGNIFICANT CHANGE UP (ref 4.2–5.8)
RBC # FLD: 11.4 % — SIGNIFICANT CHANGE UP (ref 10.3–14.5)
RBC # FLD: 12.4 % — SIGNIFICANT CHANGE UP (ref 10.3–14.5)
RH IG SCN BLD-IMP: POSITIVE — SIGNIFICANT CHANGE UP
RH IG SCN BLD-IMP: POSITIVE — SIGNIFICANT CHANGE UP
SAO2 % BLDV: 87.4 % — HIGH (ref 60–85)
SODIUM SERPL-SCNC: 134 MMOL/L — LOW (ref 135–145)
SODIUM SERPL-SCNC: 138 MMOL/L — SIGNIFICANT CHANGE UP (ref 135–145)
SODIUM SERPL-SCNC: 138 MMOL/L — SIGNIFICANT CHANGE UP (ref 135–145)
TROPONIN T, HIGH SENSITIVITY RESULT: 8 NG/L — SIGNIFICANT CHANGE UP (ref 0–51)
WBC # BLD: 10.4 K/UL — SIGNIFICANT CHANGE UP (ref 3.8–10.5)
WBC # BLD: 7.53 K/UL — SIGNIFICANT CHANGE UP (ref 3.8–10.5)
WBC # FLD AUTO: 10.4 K/UL — SIGNIFICANT CHANGE UP (ref 3.8–10.5)
WBC # FLD AUTO: 7.53 K/UL — SIGNIFICANT CHANGE UP (ref 3.8–10.5)

## 2019-05-31 PROCEDURE — 0042T: CPT

## 2019-05-31 PROCEDURE — 93010 ELECTROCARDIOGRAM REPORT: CPT

## 2019-05-31 PROCEDURE — 99285 EMERGENCY DEPT VISIT HI MDM: CPT | Mod: 25

## 2019-05-31 PROCEDURE — 70450 CT HEAD/BRAIN W/O DYE: CPT | Mod: 26

## 2019-05-31 PROCEDURE — 70496 CT ANGIOGRAPHY HEAD: CPT | Mod: 26

## 2019-05-31 PROCEDURE — 99223 1ST HOSP IP/OBS HIGH 75: CPT

## 2019-05-31 PROCEDURE — 99291 CRITICAL CARE FIRST HOUR: CPT

## 2019-05-31 PROCEDURE — 70498 CT ANGIOGRAPHY NECK: CPT | Mod: 26

## 2019-05-31 PROCEDURE — 70450 CT HEAD/BRAIN W/O DYE: CPT | Mod: 26,77

## 2019-05-31 RX ORDER — SODIUM CHLORIDE 5 G/100ML
1000 INJECTION, SOLUTION INTRAVENOUS
Refills: 0 | Status: DISCONTINUED | OUTPATIENT
Start: 2019-05-31 | End: 2019-06-01

## 2019-05-31 RX ORDER — SODIUM CHLORIDE 9 MG/ML
1000 INJECTION INTRAMUSCULAR; INTRAVENOUS; SUBCUTANEOUS
Refills: 0 | Status: DISCONTINUED | OUTPATIENT
Start: 2019-05-31 | End: 2019-05-31

## 2019-05-31 RX ORDER — ONDANSETRON 8 MG/1
4 TABLET, FILM COATED ORAL ONCE
Refills: 0 | Status: COMPLETED | OUTPATIENT
Start: 2019-05-31 | End: 2019-05-31

## 2019-05-31 RX ORDER — ACETAMINOPHEN 500 MG
1000 TABLET ORAL ONCE
Refills: 0 | Status: COMPLETED | OUTPATIENT
Start: 2019-05-31 | End: 2019-05-31

## 2019-05-31 RX ORDER — ENOXAPARIN SODIUM 100 MG/ML
40 INJECTION SUBCUTANEOUS EVERY 24 HOURS
Refills: 0 | Status: DISCONTINUED | OUTPATIENT
Start: 2019-05-31 | End: 2019-06-02

## 2019-05-31 RX ORDER — ATORVASTATIN CALCIUM 80 MG/1
80 TABLET, FILM COATED ORAL AT BEDTIME
Refills: 0 | Status: DISCONTINUED | OUTPATIENT
Start: 2019-05-31 | End: 2019-06-02

## 2019-05-31 RX ORDER — ASPIRIN/CALCIUM CARB/MAGNESIUM 324 MG
300 TABLET ORAL DAILY
Refills: 0 | Status: DISCONTINUED | OUTPATIENT
Start: 2019-05-31 | End: 2019-06-01

## 2019-05-31 RX ADMIN — Medication 1000 MILLIGRAM(S): at 21:15

## 2019-05-31 RX ADMIN — Medication 300 MILLIGRAM(S): at 13:34

## 2019-05-31 RX ADMIN — SODIUM CHLORIDE 50 MILLILITER(S): 5 INJECTION, SOLUTION INTRAVENOUS at 11:23

## 2019-05-31 RX ADMIN — ONDANSETRON 4 MILLIGRAM(S): 8 TABLET, FILM COATED ORAL at 14:43

## 2019-05-31 RX ADMIN — ONDANSETRON 4 MILLIGRAM(S): 8 TABLET, FILM COATED ORAL at 22:41

## 2019-05-31 RX ADMIN — ONDANSETRON 4 MILLIGRAM(S): 8 TABLET, FILM COATED ORAL at 09:40

## 2019-05-31 RX ADMIN — ENOXAPARIN SODIUM 40 MILLIGRAM(S): 100 INJECTION SUBCUTANEOUS at 21:53

## 2019-05-31 RX ADMIN — Medication 400 MILLIGRAM(S): at 20:34

## 2019-05-31 RX ADMIN — ONDANSETRON 4 MILLIGRAM(S): 8 TABLET, FILM COATED ORAL at 18:07

## 2019-05-31 NOTE — STROKE CODE NOTE - NIH STROKE SCALE: 11. EXTINCTION AND INATTENTION, QM
(1) Visual, tactile, auditory, spatial, or personal inattention or extinction to bilateral simultaneous stimulation in one of the sensory modalities
(0) No abnormality

## 2019-05-31 NOTE — ED PROVIDER NOTE - CLINICAL SUMMARY MEDICAL DECISION MAKING FREE TEXT BOX
Resident: code stroke, left sided weakness, last known normal 1:30am. ICA occulsion on CT, will transfer for neuro intervention. Not TPA candidate due to outside window and Xarelto.

## 2019-05-31 NOTE — ED ADULT NURSE NOTE - NSIMPLEMENTINTERV_GEN_ALL_ED
Implemented All Fall with Harm Risk Interventions:  [place] to call system. Call bell, personal items and telephone within reach. Instruct patient to call for assistance. Room bathroom lighting operational. Non-slip footwear when patient is off stretcher. Physically safe environment: no spills, clutter or unnecessary equipment. Stretcher in lowest position, wheels locked, appropriate side rails in place. Provide visual cue, wrist band, yellow gown, etc. Monitor gait and stability. Monitor for mental status changes and reorient to person, place, and time. Review medications for side effects contributing to fall risk. Reinforce activity limits and safety measures with patient and family. Provide visual clues: red socks.

## 2019-05-31 NOTE — ED PROVIDER NOTE - PROGRESS NOTE DETAILS
Resident: per neuro, ICA occlusion, will be transferred to [facility] for endovascular treatment, accepted by Dr. Libman, will be transferred to emergency department. Resident: per neuro, CT shows ICA occlusion vs possible dissection, will be transferred to [facility] for endovascular treatment, accepted by Dr. Libman, will be transferred to emergency department.

## 2019-05-31 NOTE — H&P ADULT - ATTENDING COMMENTS
74-year-old right-handed [place] gentleman first evaluated at [facility] on 5/31/19 with left hemiparesis. His son provided the history and also translated. He has a history of atrial fibrillation, on Xarelto. On 5/31/19, in the morning, he had difficulty getting out of bed due to severe left hemiparesis. ROS otherwise negative. Exam. Alert with probable at least mild left babak-neglect; anosognosia for left hemiparesis; Asomatognosia; decreased blink to threat on left; dense left gaze palsy; moderate left central facial palsy; severe dysarthria-unintelligible; left side-no movement; sensory-mild-moderately decreased grimace to noxious stimuli on left; gait not tested; remainder of neurologic exam was nonfocal. CT head (5/31/19) to my eye showed  subtle low density consistent with an acute, large right MCA infarct involving the frontal, temporal and basal ganglia regions. There was a chronic, small right MCA infarct in the parietal region. CTA neck (5/31/19) to my eye showed some tapering of the right ICA a few centimeters distal to the bulb, possibly consistent with dissection versus atherosclerosis.  CTA head (5/31/19) to my eye showed a right M1 occlusion. Impression. On 5/31/19 he developed acute left hemiparesis, due to a large right MCA infarct, most likely due to cardioembolism related to atrial fibrillation, with right carotid dissection or atherosclerosis being less likely. Plan. MRI brain; MRA neck and head/MRI neck T1-weighted fat sat; neuro checks for possible hemicraniectomy, although at his age, the risks and benefits of hemicraniectomy may favor survival but probably not good functional outcome; aspirin for now; in 10-14 days, depending on clinical course, may change aspirin to Eliquis; PT/OT.

## 2019-05-31 NOTE — STROKE CODE NOTE - NIH STROKE SCALE: 1A. LEVEL OF CONSCIOUSNESS, QM
(0) Alert; keenly responsive
(1) Not alert; but arousable by minor stimulation to obey, answer, or respond

## 2019-05-31 NOTE — ED PROVIDER NOTE - OBJECTIVE STATEMENT
74y M PMH afib on xarelto, HTN, HLD, DM  transferred from [facility] for Right MCA occlusion, with LEFT dense paresis.. Last known normal 1:30am, prior to going to bed. Woke up at 6am with left sided weakness, slurred speech. Code Stoke called upon arrival to [facility] for possible clot retrieval.  denies fevers, chills, falls, headache, chest pain, sob, abd pain, nausea, vomiting, diarrhea, or other issues.

## 2019-05-31 NOTE — ED PROVIDER NOTE - CLINICAL SUMMARY MEDICAL DECISION MAKING FREE TEXT BOX
male with known R MCA occlusion, transferred for possible clot retrieval. code stroke called upon arrival to mobilize resources. pending ct perfusion for disposition. patient is critically ill with stable vitals, protecting airway at this time.

## 2019-05-31 NOTE — SWALLOW BEDSIDE ASSESSMENT ADULT - ADDITIONAL RECOMMENDATIONS
- Will re-evaluate on MONDAY to determine candidacy for swallow study   -please order formal speech and language evaluation - Will re-evaluate on MONDAY to determine candidacy for swallow study   -please order formal speech and language evaluation  -provide oral care

## 2019-05-31 NOTE — SWALLOW BEDSIDE ASSESSMENT ADULT - SLP GENERAL OBSERVATIONS
ROBERTO Sarabia stated that pt unable to utilize  phone for evaluation 2/2 aphasia and dysarthria. Stated that son present and has been providing translation. D/W pt and family upon arrival and they wished for son, tone, to translate. Pt Nepali speaking, full independent PTA, on regular diet PTA. Wife endorsed GERD PTA ("heartburn") on Zantac. Stated that she occasionally noted cough with solids for the past 3 years, not progressive, no w/u. At present: Pt with dense L HP, facial weakness, limited eye opening (but awake- ? eye opening apraxia as pt attempting to hold lid open with hand), R side preference, evidence of L hemisensory loss. Speech severely impaired- marked articulatory distortions, unable to produce phonemes at times (but able to grossly repeat vowels/diadokinetic phonemes.)Rate very slow, poor respiratory support (MPT 3 sec). Wet/harsh/weak quality. Phoneme repetition (? dysfluency) Poorly intelligible. Able to inconsistently answer very simple personally relevant  questions (oriented x2, able to name family), able to follow some simple commands and ID some simple body parts. HR to 126 (has AFIB). RR 21, Sat 96% RN No stated that pt unable to utilize  phone for evaluation 2/2 aphasia and dysarthria. Stated that son present and has been providing translation. D/W pt and family upon arrival and they wished for son, tone, to translate. Pt Faroese speaking, full independent PTA, on regular diet PTA. Wife endorsed GERD PTA ("heartburn") on Zantac. Stated that she occasionally noted cough with solids for the past 3 years, not progressive, no w/u. At present: Pt with dense L HP, facial weakness, limited eye opening (but awake- ? eye opening apraxia as pt attempting to hold lid open with hand), R side preference, evidence of L hemisensory loss. Speech severely impaired- marked articulatory distortions, unable to produce phonemes at times (but able to grossly repeat vowels/diadokinetic phonemes.)Rate very slow, poor respiratory support (MPT 3 sec). Wet/harsh/weak quality. Phoneme repetition (? dysfluency) Poorly intelligible. Able to inconsistently answer very simple personally relevant  questions (oriented x2, able to name family), able to follow some simple commands and ID some simple body parts.

## 2019-05-31 NOTE — STROKE CODE NOTE - NIH STROKE SCALE: 4. FACIAL PALSY, QM
(2) Partial paralysis (total or near-total paralysis of lower face)
(2) Partial paralysis (total or near-total paralysis of lower face)

## 2019-05-31 NOTE — ED PROVIDER NOTE - ATTENDING CONTRIBUTION TO CARE
Dr. Arora: 73 yo male with afib on Xarelto, HTN, HLD, DM, last known baseline normal 1:30 AM today (when pt went ot bed), in ED with slurred speech and left body weakness noted upon awakening at 6 AM today.  Code stroke called on pt arrival.  No falls.  Pt without pain complaints at time of my eval.  On exam pt unwell appearing but in NAD, heart irregular, lungs CTAB, abd NTND, extremities without swelling.  +right facial droop including eyelid.  +0/5 strength in left UE/LE, 5/5 in right UE/LE.  +significant slurring of speech/aphasia.  Imaging showing ICA occlusion vs. dissection, neuro aware, recommending emergent transfer to [facility] for possible endovascular intervention.  No tPA or other medication to be given in ED. Dr. Arora: 73 yo male with afib on Xarelto, HTN, HLD, DM, last known baseline normal 1:30 AM today (when pt went ot bed), in ED with slurred speech and left body weakness noted upon awakening at 6 AM today.  Code stroke called on pt arrival.  No falls.  Pt without pain complaints at time of my eval.  On exam pt unwell appearing but in NAD, heart irregular, lungs CTAB, abd NTND, extremities without swelling.  +right facial droop including eyelid.  +0/5 strength in left UE/LE, 5/5 in right UE/LE.  +significant slurring of speech/aphasia.  Imaging showing occlusion vs. dissection, neuro aware, recommending emergent transfer to [facility] for possible endovascular intervention.  No tPA or other medication to be given in ED.

## 2019-05-31 NOTE — ED ADULT TRIAGE NOTE - CHIEF COMPLAINT QUOTE
Pt arrives to ED via EMS with last seen normal at 01:00 and awoke at 06:30 with left sided weakness and right sided facial droop with slurred speech.  ROCKY called to [facility] for assessment.  sq=[phone] .  Pt on Xarelto for irregular heart beat.  CODE STROKE called by ROCKY.  18g iv placed to right wrist by RN.  Pt taken to CT.

## 2019-05-31 NOTE — ED PROVIDER NOTE - CRITICAL CARE PROVIDED
consultation with other physicians/direct patient care (not related to procedure)/documentation/consult w/ pt's family directly relating to pts condition/interpretation of diagnostic studies/additional history taking

## 2019-05-31 NOTE — STROKE CODE NOTE - NIH STROKE SCALE: 8. SENSORY, QM
(2) Severe to total sensory loss; patient is not aware of being touched in the face, arm, and leg
(2) Severe to total sensory loss; patient is not aware of being touched in the face, arm, and leg

## 2019-05-31 NOTE — ED ADULT NURSE NOTE - OBJECTIVE STATEMENT
74 year old male BIBA with a pmh of HTN, HLD, DM, afib, on xarelto  transferred from [facility] for Right MCA occlusion, with LEFT dense paresis. patient was last known normal at 1:30am, prior to going to bed. Woke up at 6am with left sided weakness, slurred speech. Code Stoke called upon arrival to [facility] for possible clot retrieval. patient lethargic, arousable to voice and touch, mostly Latvian speaking, moaning and groaning nodding and shaking head to answers. noted with partial paralysis to face. unable to move left upper and lower ext. no drift to RUE and RLE. no TPA given at [facility].

## 2019-05-31 NOTE — ED PROVIDER NOTE - OBJECTIVE STATEMENT
Resident: Serbian speaking male accompanied by wife and son, family translating. 74y M PMH afib on xarelto, HTN, HLD, DM presents with left sided weakness. Last known normal 1:30am, prior to going to bed. Woke up at 6am with left sided weakness, slurred speech. Code Stoke called. Resident: Latvian speaking male accompanied by wife and son, family translating. 74y M PMH afib on xarelto, HTN, HLD, DM presents with left sided weakness. Last known normal 1:30am, prior to going to bed.  Woke up at 6am with left sided weakness, slurred speech. Code Stoke called.  No known trauma.

## 2019-05-31 NOTE — CONSULT NOTE ADULT - SUBJECTIVE AND OBJECTIVE BOX
HPI:    Patient is a 74 year old right handed Sami male with a history of Afib on Xarelto, HTN, HLD who presents to the ED at [facility] as a code stroke. Patient was last seen to be normal at 1 am on 5/31 when he finished watching TV with his son and went up stairs to bed. His wife woke up at 6:40 am today and tried to wake him and found him to be weak on the left side and with severely slurred speech so he was brought to the ED. His wife did not notice him get out of bed or going to the bathroom between 1 am - 6:40 am. He has no history of stroke or TIA.    MEDICATIONS  (STANDING):  ondansetron Injectable 4 milliGRAM(s) IV Push once    MEDICATIONS  (PRN):    PAST MEDICAL & SURGICAL HISTORY:  HTN (hypertension)  No significant past surgical history    FAMILY HISTORY:    Allergies    No Known Allergies    Intolerances    SHx - No smoking history; occasional alcohol use        Vital Signs Last 24 Hrs  T(C): 36.9 (31 May 2019 09:39), Max: 36.9 (31 May 2019 09:39)  T(F): 98.4 (31 May 2019 09:39), Max: 98.4 (31 May 2019 09:39)  HR: 86 (31 May 2019 09:39) (86 - 86)  BP: 158/117 (31 May 2019 09:39) (158/117 - 158/117)  BP(mean): --  RR: 20 (31 May 2019 09:39) (20 - 20)  SpO2: 96% (31 May 2019 09:39) (96% - 96%)    Awake, alert  Follows simple commands in Sami  Severe dysarthria  Eye opening apaxia  L facial droop   Strength:   LUE 0/5, LLE 0/5, RUE/RLE full strength   Pronator drift: LUE               Dysmetria: unable to assess on the left   Sensation: decreased sensation to light touch and pain on the left   Gait: not assessed    05-31    134<L>  |  101  |  19  ----------------------------<  159<H>  5.2   |  23  |  0.96    Ca    9.2      31 May 2019 09:13    TPro  7.2  /  Alb  4.0  /  TBili  1.6<H>  /  DBili  x   /  AST  22  /  ALT  16  /  AlkPhos  60  05-31                            16.0   10.4  )-----------( 250      ( 31 May 2019 09:13 )             47.1       Radiology

## 2019-05-31 NOTE — ED ADULT NURSE NOTE - NSIMPLEMENTINTERV_GEN_ALL_ED
Implemented All Fall with Harm Risk Interventions:  [place] to call system. Call bell, personal items and telephone within reach. Instruct patient to call for assistance. Room bathroom lighting operational. Non-slip footwear when patient is off stretcher. Physically safe environment: no spills, clutter or unnecessary equipment. Stretcher in lowest position, wheels locked, appropriate side rails in place. Provide visual cue, wrist band, yellow gown, etc. Monitor gait and stability. Monitor for mental status changes and reorient to person, place, and time. Review medications for side effects contributing to fall risk. Reinforce activity limits and safety measures with patient and family. Provide visual clues: red socks.

## 2019-05-31 NOTE — SWALLOW BEDSIDE ASSESSMENT ADULT - SWALLOW EVAL: SECRETION MANAGEMENT
fine upper airway congestion - multiple cued weak coughs were elicited upon command in an attempt to clear/problems swallowing secretions

## 2019-05-31 NOTE — H&P ADULT - MENTAL STATUS
alert awake following command, severe dysarthria, unable to check language component due to language barriers

## 2019-05-31 NOTE — SWALLOW BEDSIDE ASSESSMENT ADULT - ASR SWALLOW LINGUAL MOBILITY
reduced depression and strength, rate of motion/impaired left lateral movement/impaired right lateral movement/impaired protrusion/impaired anterior elevation

## 2019-05-31 NOTE — ED PROVIDER NOTE - CARE PLAN
Principal Discharge DX:	Cerebrovascular accident (CVA) due to occlusion of right middle cerebral artery  Secondary Diagnosis:	Weakness

## 2019-05-31 NOTE — ED PROVIDER NOTE - NEUROLOGICAL GAIT WEIGHT BEARING
[Weight management counseling provided] : Weight management [Healthy eating counseling provided] : healthy eating [Activity counseling provided] : activity [Behavioral health counseling provided] : behavioral health  [None] : None [Good understanding] : Patient has a good understanding of lifestyle changes and the steps needed to achieve self management goals UNABLE

## 2019-05-31 NOTE — H&P ADULT - CRANIAL NERVE
PERRL, rightward eye deviation - unable to cross midline, hemianopia on left side, left facial droop

## 2019-05-31 NOTE — ED ADULT NURSE NOTE - OBJECTIVE STATEMENT
Pt received to TR DANIEL from CT scan awake, lethargic and breathing with ease on nasal cannula. Labs sent by facilitator RN and 18G noted to the right wrist with no signs of redness or swelling. As per family, pt condition remains unchanged and has slurred speech, expressive aphasia and left side weakness noted. Afib noted on cardiac monitor and report given to Transfer center. Pt awaiting EMS to [place].

## 2019-05-31 NOTE — SWALLOW BEDSIDE ASSESSMENT ADULT - PHARYNGEAL PHASE
Delayed pharyngeal swallow/Decreased laryngeal elevation/Multiple swallows/Wet vocal quality post oral intake

## 2019-05-31 NOTE — CONSULT NOTE ADULT - ASSESSMENT
Patient is a 74 year old right handed English male with a history of Afib on Xarelto, HTN, HLD who presents to the ED at [facility] as a code stroke. Patient was last seen to be normal at 1 am on 5/31 when he finished watching TV with his son and went up stairs to bed. His wife woke up at 6:40 am today and tried to wake him and found him to be weak on the left side and with severely slurred speech so he was brought to the ED. His wife did not notice him get out of bed or going to the bathroom between 1 am - 6:40 am. He has no history of stroke or TIA.    Upon arrival to [facility], patient was found to have left hemiplegia, loss of sensation to the left, left facial droop, severe dysarthria.    NIHSS 19. MRS 0. He was not a candidate for tPA due to being outside the appropriate window and due to taking Xarelto. He was found on vessel imaging to have possible M1 occlusion as well as R ICA occlusion, so he was transferred to [facility]. Wife and son are at bedside and updated with plan.     Recommendations:  Transfer to [facility] for possible endovascular intervention, CT perfusion scan

## 2019-05-31 NOTE — H&P ADULT - ASSESSMENT
Patient is a 74 year old right handed Mongolian male with a history of Afib on Xarelto, HTN, HLD who presents to the ED for eval of left sided weakness. Patient was later transfer to [facility] for further management and possible intervention. Patient was last seen to be normal at 1 am on 5/31 when he finished watching TV with his son and went up stairs to bed. His wife woke up at 6:40 am today and tried to wake him and found him to be weak on the left side and with severely slurred speech so he was brought to the ED. His wife did not notice him get out of bed or going to the bathroom between 1 am - 6:40 am. He has no history of stroke or TIA. Patient is complaint with Xarelto. NIHSS is 17 and mrs was 0. tpa was not given as patient was out of window and was on Xarelto. Patient at [facility] found to have right proximal M1 occlusion. Patient had CT head and CT perfusion done at [facility], which showed early signs of RMCA infarction and core infarct of 152 cc. Patient is not candidate for intervention as patient has large core infarct.     Impression     Acute right MCA infarction secondary to cardioembolism in setting of A.fibb     Plan     Permissive HTN x 24hrs goal 220/120   MRI brain w/o cont  MRA neck w/ cont fat sat if secondary discussion with radiologist is suggestive of dissection   HgA1c  Lipid profile  ASA 81mg, hold Xarelto for 2 weeks due to large infarct size   Atorvastatin 80 mg   TTE  Telemetry monitoring  PT/OT/dysphagia screen   DVT prophylaxis   IV fluids 100 ml/hr

## 2019-05-31 NOTE — ED ADULT NURSE NOTE - CHIEF COMPLAINT QUOTE
Pt arrives to ED via EMS with last seen normal at 01:00 and awoke at 06:30 with left sided weakness and right sided facial droop with slurred speech.  ROCKY called to [facility] for assessment.  fl=084 .  Pt on Xarelto for irregular heart beat.  CODE STROKE called by ROCKY.  18g iv placed to right wrist by RN.  Pt taken to CT.

## 2019-05-31 NOTE — SWALLOW BEDSIDE ASSESSMENT ADULT - SWALLOW EVAL: DIAGNOSIS
Patient presents with: 1) jian-pharyngeal dysphagia with impaired oral management, s/s suggestive of delay in trigger of the swallow reflex/post swallow residue/laryngeal penetration-aspiration on the most conservative p.o. texture 2) Severe dysarthria 3) Aphasia/Cognitive-linguisitic impairment based upon screen.

## 2019-05-31 NOTE — ED PROVIDER NOTE - ATTENDING CONTRIBUTION TO CARE
attending Carlos: Code stroke called on arrival  74yM h/o afib on xarelto, HTN, HLD, DM transferred from OSH for R MCA occlusion, with LEFT dense paresis. LKN 1:30am, awoke 6am with L sided weakness, slurred speech. Will obtain repeat CTH, CT perfusion, neuro eval for possible endovascular intervention, admit

## 2019-05-31 NOTE — H&P ADULT - NSHPLABSRESULTS_GEN_ALL_CORE
16.0   10.4  )-----------( 250      ( 31 May 2019 09:13 )             47.1     PT/INR - ( 31 May 2019 09:13 )   PT: 10.8 sec;   INR: 0.95 ratio         PTT - ( 31 May 2019 09:13 )  PTT:26.4 sec    05-31    134<L>  |  101  |  19  ----------------------------<  159<H>  5.2   |  23  |  0.96    Ca    9.2      31 May 2019 09:13    TPro  7.2  /  Alb  4.0  /  TBili  1.6<H>  /  DBili  x   /  AST  22  /  ALT  16  /  AlkPhos  60  05-31      < from: CT Perfusion w/ Maps w/ IV Cont (05.31.19 @ 09:26) >    There is a subtle large area of lucency in the right frontal temporal   region basal ganglia consistent with an acute infarct in a large portion   of the right middle cerebral artery distribution. There is no hemorrhage.   Increased density to the vascular system is consistent with retained   contrast from the patient's previous CTA. Lucency in the right parietal   cortex is consistent with old ischemia.       CTP:    After the intravenous administration of 50 cc of Omnipaque 300, serial   thin sections were obtained through the brain for the purposes of   evaluating CT perfusion. Data was sent to the Rapid ischemia view   software for postprocessing.     There is a large core infarct measuring 152 ml, delayed mean transit   time 240 ml    CBF<30% volume: 152 ml  T max>6.0s volume: 240 ml  Mismatch volume: 88 ml  Mismatch ratio: 1.6  CBV<34% volume: 139 ml          Impression: Large acute right middle cerebral artery infarct. No   hemorrhage.    < end of copied text >

## 2019-05-31 NOTE — H&P ADULT - HISTORY OF PRESENT ILLNESS
Patient is a 74 year old right handed Frisian male with a history of Afib on Xarelto, HTN, HLD who presents to the ED for eval of left sided weakness. Patient was later transfer to NS for further management and possible intervention. Patient was last seen to be normal at 1 am on 5/31 when he finished watching TV with his son and went up stairs to bed. His wife woke up at 6:40 am today and tried to wake him and found him to be weak on the left side and with severely slurred speech so he was brought to the ED. His wife did not notice him get out of bed or going to the bathroom between 1 am - 6:40 am. He has no history of stroke or TIA. Patient is complaint with Xarelto. NIHSS is 17 and mrs was 0. tpa was not given as patient was out of window and was on Xarelto. Patient at [facility] found to have right proximal M1 occlusion. Patient had CT head and CT perfusion done at [facility], which showed early signs of RMCA infarction and core infarct of 152 cc. Patient is not candidate for intervention as patient has large core infarct. Patient is a 74 year old right handed Croatian male with a history of Afib on Xarelto, HTN, HLD who presents to the ED for eval of left sided weakness. Patient was later transfer to NS for further management and possible intervention. Patient was last seen to be normal at 1 am on 5/31 when he finished watching TV with his son and went up stairs to bed. His wife woke up at 6:40 am today and tried to wake him and found him to be weak on the left side and with severely slurred speech so he was brought to the ED. His wife did not notice him get out of bed or going to the bathroom between 1 am - 6:40 am. He has no history of stroke or TIA. Patient is complaint with Xarelto. NIHSS was 17 at [facility] and was 20 at [facility] and mrs was 0. tpa was not given as patient was out of window and was on Xarelto. Patient at [facility] found to have right proximal M1 occlusion. Patient had CT head and CT perfusion done at [facility], which showed early signs of RMCA infarction and core infarct of 152 cc. Patient is not candidate for intervention as patient has large core infarct.

## 2019-05-31 NOTE — SWALLOW BEDSIDE ASSESSMENT ADULT - ORAL PHASE
Oral transit time noted to be 6  seconds./Stasis in anterior sulcus/Delayed oral transit time/Stasis in lateral sulci/Lingual stasis

## 2019-06-01 ENCOUNTER — OUTPATIENT (OUTPATIENT)
Dept: OUTPATIENT SERVICES | Facility: HOSPITAL | Age: 74
LOS: 1 days | End: 2019-06-01

## 2019-06-01 LAB
ANION GAP SERPL CALC-SCNC: 11 MMOL/L — SIGNIFICANT CHANGE UP (ref 5–17)
ANION GAP SERPL CALC-SCNC: 15 MMOL/L — SIGNIFICANT CHANGE UP (ref 5–17)
APPEARANCE UR: CLEAR — SIGNIFICANT CHANGE UP
BASOPHILS # BLD AUTO: 0.1 K/UL — SIGNIFICANT CHANGE UP (ref 0–0.2)
BASOPHILS NFR BLD AUTO: 0.5 % — SIGNIFICANT CHANGE UP (ref 0–2)
BILIRUB UR-MCNC: NEGATIVE — SIGNIFICANT CHANGE UP
BUN SERPL-MCNC: 15 MG/DL — SIGNIFICANT CHANGE UP (ref 7–23)
BUN SERPL-MCNC: 16 MG/DL — SIGNIFICANT CHANGE UP (ref 7–23)
BUN SERPL-MCNC: 17 MG/DL — SIGNIFICANT CHANGE UP (ref 7–23)
BUN SERPL-MCNC: 17 MG/DL — SIGNIFICANT CHANGE UP (ref 7–23)
CALCIUM SERPL-MCNC: 8.7 MG/DL — SIGNIFICANT CHANGE UP (ref 8.4–10.5)
CALCIUM SERPL-MCNC: 9 MG/DL — SIGNIFICANT CHANGE UP (ref 8.4–10.5)
CALCIUM SERPL-MCNC: 9.1 MG/DL — SIGNIFICANT CHANGE UP (ref 8.4–10.5)
CALCIUM SERPL-MCNC: 9.3 MG/DL — SIGNIFICANT CHANGE UP (ref 8.4–10.5)
CHLORIDE SERPL-SCNC: 102 MMOL/L — SIGNIFICANT CHANGE UP (ref 96–108)
CHLORIDE SERPL-SCNC: 102 MMOL/L — SIGNIFICANT CHANGE UP (ref 96–108)
CHLORIDE SERPL-SCNC: 109 MMOL/L — HIGH (ref 96–108)
CHLORIDE SERPL-SCNC: 110 MMOL/L — HIGH (ref 96–108)
CHOLEST SERPL-MCNC: 144 MG/DL — SIGNIFICANT CHANGE UP (ref 10–199)
CO2 SERPL-SCNC: 19 MMOL/L — LOW (ref 22–31)
CO2 SERPL-SCNC: 20 MMOL/L — LOW (ref 22–31)
CO2 SERPL-SCNC: 21 MMOL/L — LOW (ref 22–31)
CO2 SERPL-SCNC: 22 MMOL/L — SIGNIFICANT CHANGE UP (ref 22–31)
COLOR SPEC: YELLOW — SIGNIFICANT CHANGE UP
CREAT SERPL-MCNC: 0.95 MG/DL — SIGNIFICANT CHANGE UP (ref 0.5–1.3)
CREAT SERPL-MCNC: 0.99 MG/DL — SIGNIFICANT CHANGE UP (ref 0.5–1.3)
CREAT SERPL-MCNC: 1 MG/DL — SIGNIFICANT CHANGE UP (ref 0.5–1.3)
CREAT SERPL-MCNC: 1 MG/DL — SIGNIFICANT CHANGE UP (ref 0.5–1.3)
DIFF PNL FLD: NEGATIVE — SIGNIFICANT CHANGE UP
EOSINOPHIL # BLD AUTO: 0 K/UL — SIGNIFICANT CHANGE UP (ref 0–0.5)
EOSINOPHIL NFR BLD AUTO: 0.1 % — SIGNIFICANT CHANGE UP (ref 0–6)
GLUCOSE BLDC GLUCOMTR-MCNC: 157 MG/DL — HIGH (ref 70–99)
GLUCOSE SERPL-MCNC: 155 MG/DL — HIGH (ref 70–99)
GLUCOSE SERPL-MCNC: 159 MG/DL — HIGH (ref 70–99)
GLUCOSE SERPL-MCNC: 162 MG/DL — HIGH (ref 70–99)
GLUCOSE SERPL-MCNC: 162 MG/DL — HIGH (ref 70–99)
GLUCOSE UR QL: ABNORMAL
HBA1C BLD-MCNC: 7.1 % — HIGH (ref 4–5.6)
HCT VFR BLD CALC: 44 % — SIGNIFICANT CHANGE UP (ref 39–50)
HCT VFR BLD CALC: 48.5 % — SIGNIFICANT CHANGE UP (ref 39–50)
HCV AB S/CO SERPL IA: 0.57 S/CO — SIGNIFICANT CHANGE UP (ref 0–0.99)
HCV AB SERPL-IMP: SIGNIFICANT CHANGE UP
HDLC SERPL-MCNC: 54 MG/DL — SIGNIFICANT CHANGE UP
HGB BLD-MCNC: 15.1 G/DL — SIGNIFICANT CHANGE UP (ref 13–17)
HGB BLD-MCNC: 16.2 G/DL — SIGNIFICANT CHANGE UP (ref 13–17)
KETONES UR-MCNC: ABNORMAL
LACTATE SERPL-SCNC: 2.9 MMOL/L — HIGH (ref 0.7–2)
LEUKOCYTE ESTERASE UR-ACNC: NEGATIVE — SIGNIFICANT CHANGE UP
LIPID PNL WITH DIRECT LDL SERPL: 78 MG/DL — SIGNIFICANT CHANGE UP
LYMPHOCYTES # BLD AUTO: 1.2 K/UL — SIGNIFICANT CHANGE UP (ref 1–3.3)
LYMPHOCYTES # BLD AUTO: 9.4 % — LOW (ref 13–44)
MCHC RBC-ENTMCNC: 32.6 PG — SIGNIFICANT CHANGE UP (ref 27–34)
MCHC RBC-ENTMCNC: 33.1 PG — SIGNIFICANT CHANGE UP (ref 27–34)
MCHC RBC-ENTMCNC: 33.4 GM/DL — SIGNIFICANT CHANGE UP (ref 32–36)
MCHC RBC-ENTMCNC: 34.3 GM/DL — SIGNIFICANT CHANGE UP (ref 32–36)
MCV RBC AUTO: 96.6 FL — SIGNIFICANT CHANGE UP (ref 80–100)
MCV RBC AUTO: 97.7 FL — SIGNIFICANT CHANGE UP (ref 80–100)
MONOCYTES # BLD AUTO: 0.6 K/UL — SIGNIFICANT CHANGE UP (ref 0–0.9)
MONOCYTES NFR BLD AUTO: 4.8 % — SIGNIFICANT CHANGE UP (ref 2–14)
NEUTROPHILS # BLD AUTO: 11 K/UL — HIGH (ref 1.8–7.4)
NEUTROPHILS NFR BLD AUTO: 85.2 % — HIGH (ref 43–77)
NITRITE UR-MCNC: NEGATIVE — SIGNIFICANT CHANGE UP
PH UR: 6.5 — SIGNIFICANT CHANGE UP (ref 5–8)
PLATELET # BLD AUTO: 232 K/UL — SIGNIFICANT CHANGE UP (ref 150–400)
PLATELET # BLD AUTO: 238 K/UL — SIGNIFICANT CHANGE UP (ref 150–400)
POTASSIUM SERPL-MCNC: 4 MMOL/L — SIGNIFICANT CHANGE UP (ref 3.5–5.3)
POTASSIUM SERPL-MCNC: 4.4 MMOL/L — SIGNIFICANT CHANGE UP (ref 3.5–5.3)
POTASSIUM SERPL-MCNC: 4.6 MMOL/L — SIGNIFICANT CHANGE UP (ref 3.5–5.3)
POTASSIUM SERPL-MCNC: 5.3 MMOL/L — SIGNIFICANT CHANGE UP (ref 3.5–5.3)
POTASSIUM SERPL-SCNC: 4 MMOL/L — SIGNIFICANT CHANGE UP (ref 3.5–5.3)
POTASSIUM SERPL-SCNC: 4.4 MMOL/L — SIGNIFICANT CHANGE UP (ref 3.5–5.3)
POTASSIUM SERPL-SCNC: 4.6 MMOL/L — SIGNIFICANT CHANGE UP (ref 3.5–5.3)
POTASSIUM SERPL-SCNC: 5.3 MMOL/L — SIGNIFICANT CHANGE UP (ref 3.5–5.3)
PROT UR-MCNC: ABNORMAL
RAPID RVP RESULT: SIGNIFICANT CHANGE UP
RBC # BLD: 4.55 M/UL — SIGNIFICANT CHANGE UP (ref 4.2–5.8)
RBC # BLD: 4.97 M/UL — SIGNIFICANT CHANGE UP (ref 4.2–5.8)
RBC # FLD: 11.7 % — SIGNIFICANT CHANGE UP (ref 10.3–14.5)
RBC # FLD: 11.8 % — SIGNIFICANT CHANGE UP (ref 10.3–14.5)
SODIUM SERPL-SCNC: 136 MMOL/L — SIGNIFICANT CHANGE UP (ref 135–145)
SODIUM SERPL-SCNC: 139 MMOL/L — SIGNIFICANT CHANGE UP (ref 135–145)
SODIUM SERPL-SCNC: 141 MMOL/L — SIGNIFICANT CHANGE UP (ref 135–145)
SODIUM SERPL-SCNC: 145 MMOL/L — SIGNIFICANT CHANGE UP (ref 135–145)
SP GR SPEC: 1.03 — HIGH (ref 1.01–1.02)
TOTAL CHOLESTEROL/HDL RATIO MEASUREMENT: 2.7 RATIO — LOW (ref 3.4–9.6)
TRIGL SERPL-MCNC: 62 MG/DL — SIGNIFICANT CHANGE UP (ref 10–149)
UROBILINOGEN FLD QL: ABNORMAL
WBC # BLD: 12.1 K/UL — HIGH (ref 3.8–10.5)
WBC # BLD: 12.9 K/UL — HIGH (ref 3.8–10.5)
WBC # FLD AUTO: 12.1 K/UL — HIGH (ref 3.8–10.5)
WBC # FLD AUTO: 12.9 K/UL — HIGH (ref 3.8–10.5)

## 2019-06-01 PROCEDURE — 70551 MRI BRAIN STEM W/O DYE: CPT | Mod: 26

## 2019-06-01 PROCEDURE — 71045 X-RAY EXAM CHEST 1 VIEW: CPT | Mod: 26

## 2019-06-01 PROCEDURE — 71045 X-RAY EXAM CHEST 1 VIEW: CPT | Mod: 26,77

## 2019-06-01 RX ORDER — DEXTROSE 50 % IN WATER 50 %
12.5 SYRINGE (ML) INTRAVENOUS ONCE
Refills: 0 | Status: DISCONTINUED | OUTPATIENT
Start: 2019-06-01 | End: 2019-06-03

## 2019-06-01 RX ORDER — ACETAMINOPHEN 500 MG
1000 TABLET ORAL ONCE
Refills: 0 | Status: COMPLETED | OUTPATIENT
Start: 2019-06-01 | End: 2019-06-01

## 2019-06-01 RX ORDER — ACETAMINOPHEN 500 MG
650 TABLET ORAL EVERY 6 HOURS
Refills: 0 | Status: DISCONTINUED | OUTPATIENT
Start: 2019-06-01 | End: 2019-06-03

## 2019-06-01 RX ORDER — METOPROLOL TARTRATE 50 MG
5 TABLET ORAL EVERY 6 HOURS
Refills: 0 | Status: DISCONTINUED | OUTPATIENT
Start: 2019-06-01 | End: 2019-06-03

## 2019-06-01 RX ORDER — RIVAROXABAN 15 MG-20MG
1 KIT ORAL
Qty: 0 | Refills: 0 | DISCHARGE

## 2019-06-01 RX ORDER — ASPIRIN/CALCIUM CARB/MAGNESIUM 324 MG
81 TABLET ORAL DAILY
Refills: 0 | Status: DISCONTINUED | OUTPATIENT
Start: 2019-06-01 | End: 2019-06-02

## 2019-06-01 RX ORDER — RANITIDINE HYDROCHLORIDE 150 MG/1
1 TABLET, FILM COATED ORAL
Qty: 0 | Refills: 0 | DISCHARGE

## 2019-06-01 RX ORDER — LOSARTAN POTASSIUM 100 MG/1
1 TABLET, FILM COATED ORAL
Qty: 0 | Refills: 0 | DISCHARGE

## 2019-06-01 RX ORDER — GLUCAGON INJECTION, SOLUTION 0.5 MG/.1ML
1 INJECTION, SOLUTION SUBCUTANEOUS ONCE
Refills: 0 | Status: DISCONTINUED | OUTPATIENT
Start: 2019-06-01 | End: 2019-06-03

## 2019-06-01 RX ORDER — ACETAMINOPHEN 500 MG
650 TABLET ORAL EVERY 6 HOURS
Refills: 0 | Status: DISCONTINUED | OUTPATIENT
Start: 2019-06-01 | End: 2019-06-01

## 2019-06-01 RX ORDER — ATORVASTATIN CALCIUM 80 MG/1
1 TABLET, FILM COATED ORAL
Qty: 0 | Refills: 0 | DISCHARGE

## 2019-06-01 RX ORDER — SODIUM CHLORIDE 5 G/100ML
1000 INJECTION, SOLUTION INTRAVENOUS
Refills: 0 | Status: DISCONTINUED | OUTPATIENT
Start: 2019-06-01 | End: 2019-06-03

## 2019-06-01 RX ORDER — INSULIN LISPRO 100/ML
VIAL (ML) SUBCUTANEOUS EVERY 6 HOURS
Refills: 0 | Status: DISCONTINUED | OUTPATIENT
Start: 2019-06-01 | End: 2019-06-03

## 2019-06-01 RX ORDER — METFORMIN HYDROCHLORIDE 850 MG/1
1 TABLET ORAL
Qty: 0 | Refills: 0 | DISCHARGE

## 2019-06-01 RX ORDER — METOPROLOL TARTRATE 50 MG
1 TABLET ORAL
Qty: 0 | Refills: 0 | DISCHARGE

## 2019-06-01 RX ADMIN — Medication 1000 MILLIGRAM(S): at 17:45

## 2019-06-01 RX ADMIN — Medication 300 MILLIGRAM(S): at 11:57

## 2019-06-01 RX ADMIN — ENOXAPARIN SODIUM 40 MILLIGRAM(S): 100 INJECTION SUBCUTANEOUS at 23:04

## 2019-06-01 RX ADMIN — ATORVASTATIN CALCIUM 80 MILLIGRAM(S): 80 TABLET, FILM COATED ORAL at 23:04

## 2019-06-01 RX ADMIN — Medication 1: at 23:31

## 2019-06-01 RX ADMIN — Medication 400 MILLIGRAM(S): at 17:15

## 2019-06-01 RX ADMIN — Medication 400 MILLIGRAM(S): at 11:56

## 2019-06-01 RX ADMIN — SODIUM CHLORIDE 75 MILLILITER(S): 5 INJECTION, SOLUTION INTRAVENOUS at 07:05

## 2019-06-01 RX ADMIN — Medication 1000 MILLIGRAM(S): at 12:30

## 2019-06-01 RX ADMIN — SODIUM CHLORIDE 60 MILLILITER(S): 5 INJECTION, SOLUTION INTRAVENOUS at 01:15

## 2019-06-01 RX ADMIN — Medication 5 MILLIGRAM(S): at 17:30

## 2019-06-01 NOTE — PROGRESS NOTE ADULT - SUBJECTIVE AND OBJECTIVE BOX
THE PATIENT WAS SEEN AND EXAMINED BY ME WITH THE HOUSESTAFF AND STROKE TEAM DURING MORNING ROUNDS.   HPI:  74-year-old right-handed Swedish gentleman first evaluated at [facility] on 5/31/19 with left hemiparesis. His son provided the history and also translated. He has a history of atrial fibrillation, on Xarelto. On 5/31/19, in the morning, he had difficulty getting out of bed due to severe left hemiparesis.     ROS otherwise negative.     SUBJECTIVE: No events overnight.  No new neurologic complaints.      aspirin Suppository 300 milliGRAM(s) Rectal daily  atorvastatin 80 milliGRAM(s) Oral at bedtime  enoxaparin Injectable 40 milliGRAM(s) SubCutaneous every 24 hours  sodium chloride 2% . 1000 milliLiter(s) IV Continuous <Continuous>    PHYSICAL EXAM:   Vital Signs Last 24 Hrs  T(C): 37.2 (01 Jun 2019 00:00), Max: 37.7 (31 May 2019 19:13)  T(F): 98.9 (01 Jun 2019 00:00), Max: 99.8 (31 May 2019 19:13)  HR: 102 (01 Jun 2019 02:00) (86 - 127)  BP: 143/109 (01 Jun 2019 02:00) (143/109 - 162/104)  BP(mean): 119 (01 Jun 2019 02:00) (112 - 119)  RR: 16 (01 Jun 2019 02:00) (16 - 23)  SpO2: 91% (01 Jun 2019 02:00) (91% - 100%)    General: No acute distress  HEENT: EOM intact, visual fields full  Abdomen: Soft, nontender, nondistended   Extremities: No edema    NEUROLOGICAL EXAM:  Mental status: Alert with probable at least mild left babak-neglect; anosognosia for left hemiparesis; Asomatognosia;  Cranial Nerves: decreased blink to threat on left; dense left gaze palsy; moderate left central facial palsy; severe dysarthria-unintelligible  Motor exam: left side-no movement; sensory-mild-moderately decreased grimace to noxious stimuli on left;  Sensation: Intact to light touch   Coordination/ Gait: No dysmetria, gait not tested    LABS:                        15.1   12.1  )-----------( 232      ( 01 Jun 2019 05:39 )             44.0    05-31    136  |  102  |  17  ----------------------------<  159<H>  5.3   |  19<L>  |  1.00    Ca    9.3      31 May 2019 23:54    TPro  7.2  /  Alb  4.0  /  TBili  1.6<H>  /  DBili  x   /  AST  22  /  ALT  16  /  AlkPhos  60  05-31  PT/INR - ( 31 May 2019 09:13 )   PT: 10.8 sec;   INR: 0.95 ratio      PTT - ( 31 May 2019 09:13 )  PTT:26.4 sec    IMAGING: Reviewed by me.     CT Head No Cont (05.31.19 19:41)  Interval demarcation of an extensive acute right MCA territory infarct.   No CT evidence for hemorrhagic transformation.   New mass effect upon the right lateral ventricle, no midline shift or   effacement of the basal cisterns.    CT Perfusion (05.31.19 09:30)  There is a large core infarct measuring 152 ml, delayed mean transit   time 240 ml  CBF<30% volume: 152 ml  T max>6.0s volume: 240 ml  Mismatch volume: 88 ml  Mismatch ratio: 1.6  CBV<34% volume: 139 ml    CT Head No Cont (05.31.19 09:30)  Large acute right middle cerebral artery infarct. No   hemorrhage.

## 2019-06-01 NOTE — OCCUPATIONAL THERAPY INITIAL EVALUATION ADULT - IMPAIRMENTS CONTRIBUTING IMPAIRED BED MOBILITY, REHAB EVAL
decreased ROM/impaired postural control/impaired coordination/impaired balance/decreased strength/decreased flexibility/impaired motor control

## 2019-06-01 NOTE — OCCUPATIONAL THERAPY INITIAL EVALUATION ADULT - ADDITIONAL COMMENTS
Continued. CT HEAD 5/31: Interval demarcation of an extensive acute right MCA territory infarct.   No CT evidence for hemorrhagic transformation.

## 2019-06-01 NOTE — OCCUPATIONAL THERAPY INITIAL EVALUATION ADULT - ADL RETRAINING, OT EVAL
GOAL: Patient will require mod A with UB dressing within 4 weeks GOAL: Pt will perform LB dressing with mod A in 4 weeks

## 2019-06-01 NOTE — OCCUPATIONAL THERAPY INITIAL EVALUATION ADULT - BALANCE TRAINING, PT EVAL
GOAL: Pt will increase dynamic sitting balance by 1/2 grade to facilitate ability to perform ADLs and functional mobility within 4 weeks

## 2019-06-01 NOTE — PROGRESS NOTE ADULT - ASSESSMENT
74-year-old right-handed Urdu gentleman first evaluated at [facility] on 5/31/19 with left hemiparesis. His son provided the history and also translated. He has a history of atrial fibrillation, on Xarelto. On 5/31/19, in the morning, he had difficulty getting out of bed due to severe left hemiparesis.     Impression. On 5/31/19 he developed acute left hemiparesis, due to a large right MCA infarct, most likely due to cardioembolism related to atrial fibrillation, with right carotid dissection or atherosclerosis being less likely.    NEURO: Continue close monitoring for neurologic deterioration in setting of cerebral edema with mass effect for possible hemicraniectomy, although at his age, the risks and benefits of hemicraniectomy may favor survival but probably not good functional outcome, permissive HTN to gradual normotension, LDL pending-titrate statin to LDL goal less than 70, MRI Brain w/o contrast, MRA Head and Neck w/ T1 fat sat pending. Physical therapy/OT pending.     ANTITHROMBOTIC THERAPY: ASA for now. May switch to Apixaban in 10-14 days from admission (6/10-6/14/19) in setting of atrial fibrillation    PULMONARY: protecting airway, saturating well     CARDIOVASCULAR: check TTE to evaluate for valvular heart disease, cardiac monitoring shows rapid afib at times.                         SBP goal: Permissive HTN to gradual normotension    GASTROINTESTINAL:  dysphagia screen failed by S&S- plan to place NGT      Diet: NPO    RENAL: BUN/Cr without acute change, good urine output      Na Goal: Greater than 135     Hall: No    HEMATOLOGY: H/H without acute change, Platelets 232      DVT ppx: LMWH    ID: afebrile, mild leukocytosis-will obtain CXR, RVP, UA to r/o infection.     OTHER: Plan endorsed to patient and son at the bedside, all questions and concerns addressed.    DISPOSITION: Rehab or home depending on PT eval once stable and workup is complete    CORE MEASURES:        Admission NIHSS: 17     TPA: NO      LDL/HDL:     Depression Screen: p     Statin Therapy: YES     Dysphagia Screen: FAIL     Smoking NO      Afib YES     Stroke Education YES    Obtain screening lower extremity venous ultrasound in patients who meet 1 or more of the following criteria as patient is high risk for DVT/PE on admission:   [] History of DVT/PE  []Hypercoagulable states (Factor V Leiden, Cancer, OCP, etc. )  []Prolonged immobility (hemiplegia/hemiparesis/post operative or any other extended immobilization)  [] Transferred from outside facility (Rehab or Long term care)  [] Age </= to 50 74-year-old right-handed Romanian gentleman first evaluated at [facility] on 5/31/19 with left hemiparesis. His son provided the history and also translated. He has a history of atrial fibrillation, on Xarelto. On 5/31/19, in the morning, he had difficulty getting out of bed due to severe left hemiparesis.     Impression. On 5/31/19 he developed acute left hemiparesis, due to a large right MCA infarct, most likely due to cardioembolism related to atrial fibrillation, with right carotid dissection or atherosclerosis being less likely.    NEURO: Neurologically with some improvement in alertness and attention, Continue close monitoring for neurologic deterioration in setting of cerebral edema with mass effect for possible hemicraniectomy, although at his age, the risks and benefits of hemicraniectomy may favor survival but probably not good functional outcome, permissive HTN to gradual normotension, LDL pending-titrate statin to LDL goal less than 70, MRI Brain w/o contrast, MRA Head and Neck w/ T1 fat sat pending. Physical therapy/OT pending.     ANTITHROMBOTIC THERAPY: ASA for now. May switch to Apixaban in 10-14 days from admission (6/10-6/14/19) in setting of atrial fibrillation    PULMONARY: protecting airway, saturating well     CARDIOVASCULAR: check TTE to evaluate for valvular heart disease, cardiac monitoring shows rapid afib at times.                         SBP goal: Permissive HTN to gradual normotension    GASTROINTESTINAL:  dysphagia screen failed by S&S- plan to place NGT      Diet: NPO    RENAL: BUN/Cr without acute change, good urine output      Na Goal: Greater than 135     Hall: No    HEMATOLOGY: H/H without acute change, Platelets 232      DVT ppx: LMWH    ID: afebrile, mild leukocytosis-will obtain CXR, RVP, UA to r/o infection.     OTHER: Plan endorsed to patient, son and daughter at the bedside, all questions and concerns addressed.    DISPOSITION: Rehab or home depending on PT eval once stable and workup is complete    CORE MEASURES:        Admission NIHSS: 17     TPA: NO      LDL/HDL: 78/54     Depression Screen: p     Statin Therapy: YES     Dysphagia Screen: FAIL     Smoking NO      Afib YES     Stroke Education YES    Obtain screening lower extremity venous ultrasound in patients who meet 1 or more of the following criteria as patient is high risk for DVT/PE on admission:   [] History of DVT/PE  []Hypercoagulable states (Factor V Leiden, Cancer, OCP, etc. )  []Prolonged immobility (hemiplegia/hemiparesis/post operative or any other extended immobilization)  [] Transferred from outside facility (Rehab or Long term care)  [] Age </= to 50

## 2019-06-01 NOTE — OCCUPATIONAL THERAPY INITIAL EVALUATION ADULT - LEVEL OF INDEPENDENCE: SIT/STAND, REHAB EVAL
unable to assess at this time due to pt being on bedpan & wife requesting to hold mobility until he's finished not appropriate at this time due to lethargy/pt not opening eyes/decreased command following

## 2019-06-01 NOTE — OCCUPATIONAL THERAPY INITIAL EVALUATION ADULT - PERTINENT HX OF CURRENT PROBLEM, REHAB EVAL
75 y/o M presents to the ED for eval of left sided weakness. Patient was last seen to be normal at 1 am on 5/31 wife found him to be weak on the L side and with severely slurred speech. Brought to the ED. NIHSS was 17 at [facility] and was 20 at [facility]; tpa not given as pt was out of window and was on Xarelto. Pt at [facility] found to have right proximal M1 occlusion. Patient had CT head and CT perfusion done at [facility], which showed early signs of RMCA infarction and core infarct of 152 cc. See below.

## 2019-06-01 NOTE — OCCUPATIONAL THERAPY INITIAL EVALUATION ADULT - DIAGNOSIS, OT EVAL
Pt presents with decreased command following, impaired cognition, decreased ROM, strength, endurance, balance, and coordination, all impacting ability to perform ADLs and functional mobility.

## 2019-06-02 DIAGNOSIS — I69.391 DYSPHAGIA FOLLOWING CEREBRAL INFARCTION: ICD-10-CM

## 2019-06-02 DIAGNOSIS — I63.511 CEREBRAL INFARCTION DUE TO UNSPECIFIED OCCLUSION OR STENOSIS OF RIGHT MIDDLE CEREBRAL ARTERY: ICD-10-CM

## 2019-06-02 DIAGNOSIS — R74.0 NONSPECIFIC ELEVATION OF LEVELS OF TRANSAMINASE AND LACTIC ACID DEHYDROGENASE [LDH]: ICD-10-CM

## 2019-06-02 LAB
ALBUMIN SERPL ELPH-MCNC: 3.2 G/DL — LOW (ref 3.3–5)
ALP SERPL-CCNC: 46 U/L — SIGNIFICANT CHANGE UP (ref 40–120)
ALT FLD-CCNC: 14 U/L — SIGNIFICANT CHANGE UP (ref 10–45)
ANION GAP SERPL CALC-SCNC: 12 MMOL/L — SIGNIFICANT CHANGE UP (ref 5–17)
ANION GAP SERPL CALC-SCNC: 13 MMOL/L — SIGNIFICANT CHANGE UP (ref 5–17)
AST SERPL-CCNC: 24 U/L — SIGNIFICANT CHANGE UP (ref 10–40)
BILIRUB DIRECT SERPL-MCNC: 0.3 MG/DL — HIGH (ref 0–0.2)
BILIRUB INDIRECT FLD-MCNC: 1.9 MG/DL — HIGH (ref 0.2–1)
BILIRUB SERPL-MCNC: 2.2 MG/DL — HIGH (ref 0.2–1.2)
BUN SERPL-MCNC: 19 MG/DL — SIGNIFICANT CHANGE UP (ref 7–23)
BUN SERPL-MCNC: 20 MG/DL — SIGNIFICANT CHANGE UP (ref 7–23)
BUN SERPL-MCNC: 20 MG/DL — SIGNIFICANT CHANGE UP (ref 7–23)
BUN SERPL-MCNC: 21 MG/DL — SIGNIFICANT CHANGE UP (ref 7–23)
CALCIUM SERPL-MCNC: 8.4 MG/DL — SIGNIFICANT CHANGE UP (ref 8.4–10.5)
CALCIUM SERPL-MCNC: 8.6 MG/DL — SIGNIFICANT CHANGE UP (ref 8.4–10.5)
CALCIUM SERPL-MCNC: 8.7 MG/DL — SIGNIFICANT CHANGE UP (ref 8.4–10.5)
CALCIUM SERPL-MCNC: 8.9 MG/DL — SIGNIFICANT CHANGE UP (ref 8.4–10.5)
CHLORIDE SERPL-SCNC: 113 MMOL/L — HIGH (ref 96–108)
CHLORIDE SERPL-SCNC: 116 MMOL/L — HIGH (ref 96–108)
CHLORIDE SERPL-SCNC: 117 MMOL/L — HIGH (ref 96–108)
CHLORIDE SERPL-SCNC: 119 MMOL/L — HIGH (ref 96–108)
CO2 SERPL-SCNC: 17 MMOL/L — LOW (ref 22–31)
CO2 SERPL-SCNC: 18 MMOL/L — LOW (ref 22–31)
CO2 SERPL-SCNC: 19 MMOL/L — LOW (ref 22–31)
CO2 SERPL-SCNC: 21 MMOL/L — LOW (ref 22–31)
CREAT SERPL-MCNC: 0.83 MG/DL — SIGNIFICANT CHANGE UP (ref 0.5–1.3)
CREAT SERPL-MCNC: 0.83 MG/DL — SIGNIFICANT CHANGE UP (ref 0.5–1.3)
CREAT SERPL-MCNC: 0.88 MG/DL — SIGNIFICANT CHANGE UP (ref 0.5–1.3)
CREAT SERPL-MCNC: 0.9 MG/DL — SIGNIFICANT CHANGE UP (ref 0.5–1.3)
GLUCOSE BLDC GLUCOMTR-MCNC: 130 MG/DL — HIGH (ref 70–99)
GLUCOSE BLDC GLUCOMTR-MCNC: 133 MG/DL — HIGH (ref 70–99)
GLUCOSE BLDC GLUCOMTR-MCNC: 136 MG/DL — HIGH (ref 70–99)
GLUCOSE BLDC GLUCOMTR-MCNC: 152 MG/DL — HIGH (ref 70–99)
GLUCOSE BLDC GLUCOMTR-MCNC: 157 MG/DL — HIGH (ref 70–99)
GLUCOSE SERPL-MCNC: 151 MG/DL — HIGH (ref 70–99)
GLUCOSE SERPL-MCNC: 151 MG/DL — HIGH (ref 70–99)
GLUCOSE SERPL-MCNC: 186 MG/DL — HIGH (ref 70–99)
GLUCOSE SERPL-MCNC: 192 MG/DL — HIGH (ref 70–99)
HCT VFR BLD CALC: 48.7 % — SIGNIFICANT CHANGE UP (ref 39–50)
HGB BLD-MCNC: 15.7 G/DL — SIGNIFICANT CHANGE UP (ref 13–17)
MCHC RBC-ENTMCNC: 32.2 GM/DL — SIGNIFICANT CHANGE UP (ref 32–36)
MCHC RBC-ENTMCNC: 32.3 PG — SIGNIFICANT CHANGE UP (ref 27–34)
MCV RBC AUTO: 100 FL — SIGNIFICANT CHANGE UP (ref 80–100)
PLATELET # BLD AUTO: 198 K/UL — SIGNIFICANT CHANGE UP (ref 150–400)
POTASSIUM SERPL-MCNC: 3.9 MMOL/L — SIGNIFICANT CHANGE UP (ref 3.5–5.3)
POTASSIUM SERPL-MCNC: 4.2 MMOL/L — SIGNIFICANT CHANGE UP (ref 3.5–5.3)
POTASSIUM SERPL-MCNC: 4.2 MMOL/L — SIGNIFICANT CHANGE UP (ref 3.5–5.3)
POTASSIUM SERPL-MCNC: 4.4 MMOL/L — SIGNIFICANT CHANGE UP (ref 3.5–5.3)
POTASSIUM SERPL-SCNC: 3.9 MMOL/L — SIGNIFICANT CHANGE UP (ref 3.5–5.3)
POTASSIUM SERPL-SCNC: 4.2 MMOL/L — SIGNIFICANT CHANGE UP (ref 3.5–5.3)
POTASSIUM SERPL-SCNC: 4.2 MMOL/L — SIGNIFICANT CHANGE UP (ref 3.5–5.3)
POTASSIUM SERPL-SCNC: 4.4 MMOL/L — SIGNIFICANT CHANGE UP (ref 3.5–5.3)
PROT SERPL-MCNC: 6.3 G/DL — SIGNIFICANT CHANGE UP (ref 6–8.3)
RBC # BLD: 4.86 M/UL — SIGNIFICANT CHANGE UP (ref 4.2–5.8)
RBC # FLD: 11.9 % — SIGNIFICANT CHANGE UP (ref 10.3–14.5)
SODIUM SERPL-SCNC: 143 MMOL/L — SIGNIFICANT CHANGE UP (ref 135–145)
SODIUM SERPL-SCNC: 148 MMOL/L — HIGH (ref 135–145)
SODIUM SERPL-SCNC: 148 MMOL/L — HIGH (ref 135–145)
SODIUM SERPL-SCNC: 150 MMOL/L — HIGH (ref 135–145)
WBC # BLD: 17.3 K/UL — HIGH (ref 3.8–10.5)
WBC # FLD AUTO: 17.3 K/UL — HIGH (ref 3.8–10.5)

## 2019-06-02 PROCEDURE — 99223 1ST HOSP IP/OBS HIGH 75: CPT | Mod: GC

## 2019-06-02 PROCEDURE — 71045 X-RAY EXAM CHEST 1 VIEW: CPT | Mod: 26

## 2019-06-02 PROCEDURE — 71045 X-RAY EXAM CHEST 1 VIEW: CPT | Mod: 26,77

## 2019-06-02 RX ORDER — AMPICILLIN SODIUM AND SULBACTAM SODIUM 250; 125 MG/ML; MG/ML
INJECTION, POWDER, FOR SUSPENSION INTRAMUSCULAR; INTRAVENOUS
Refills: 0 | Status: DISCONTINUED | OUTPATIENT
Start: 2019-06-02 | End: 2019-06-03

## 2019-06-02 RX ORDER — ATORVASTATIN CALCIUM 80 MG/1
20 TABLET, FILM COATED ORAL AT BEDTIME
Refills: 0 | Status: DISCONTINUED | OUTPATIENT
Start: 2019-06-02 | End: 2019-06-03

## 2019-06-02 RX ORDER — AMPICILLIN SODIUM AND SULBACTAM SODIUM 250; 125 MG/ML; MG/ML
3 INJECTION, POWDER, FOR SUSPENSION INTRAMUSCULAR; INTRAVENOUS EVERY 6 HOURS
Refills: 0 | Status: DISCONTINUED | OUTPATIENT
Start: 2019-06-02 | End: 2019-06-03

## 2019-06-02 RX ORDER — METRONIDAZOLE 500 MG
TABLET ORAL
Refills: 0 | Status: DISCONTINUED | OUTPATIENT
Start: 2019-06-02 | End: 2019-06-02

## 2019-06-02 RX ORDER — METRONIDAZOLE 500 MG
500 TABLET ORAL EVERY 8 HOURS
Refills: 0 | Status: DISCONTINUED | OUTPATIENT
Start: 2019-06-02 | End: 2019-06-02

## 2019-06-02 RX ORDER — CEFTRIAXONE 500 MG/1
1 INJECTION, POWDER, FOR SOLUTION INTRAMUSCULAR; INTRAVENOUS ONCE
Refills: 0 | Status: COMPLETED | OUTPATIENT
Start: 2019-06-02 | End: 2019-06-02

## 2019-06-02 RX ORDER — METRONIDAZOLE 500 MG
500 TABLET ORAL ONCE
Refills: 0 | Status: COMPLETED | OUTPATIENT
Start: 2019-06-02 | End: 2019-06-02

## 2019-06-02 RX ORDER — CEFUROXIME AXETIL 250 MG
750 TABLET ORAL EVERY 8 HOURS
Refills: 0 | Status: DISCONTINUED | OUTPATIENT
Start: 2019-06-02 | End: 2019-06-02

## 2019-06-02 RX ORDER — AMPICILLIN SODIUM AND SULBACTAM SODIUM 250; 125 MG/ML; MG/ML
3 INJECTION, POWDER, FOR SUSPENSION INTRAMUSCULAR; INTRAVENOUS ONCE
Refills: 0 | Status: COMPLETED | OUTPATIENT
Start: 2019-06-02 | End: 2019-06-02

## 2019-06-02 RX ADMIN — AMPICILLIN SODIUM AND SULBACTAM SODIUM 200 GRAM(S): 250; 125 INJECTION, POWDER, FOR SUSPENSION INTRAMUSCULAR; INTRAVENOUS at 17:25

## 2019-06-02 RX ADMIN — Medication 5 MILLIGRAM(S): at 21:41

## 2019-06-02 RX ADMIN — SODIUM CHLORIDE 75 MILLILITER(S): 5 INJECTION, SOLUTION INTRAVENOUS at 00:41

## 2019-06-02 RX ADMIN — Medication 1: at 11:54

## 2019-06-02 RX ADMIN — Medication 100 MILLIGRAM(S): at 07:17

## 2019-06-02 RX ADMIN — Medication 650 MILLIGRAM(S): at 00:20

## 2019-06-02 RX ADMIN — CEFTRIAXONE 100 GRAM(S): 500 INJECTION, POWDER, FOR SOLUTION INTRAMUSCULAR; INTRAVENOUS at 02:08

## 2019-06-02 RX ADMIN — Medication 5 MILLIGRAM(S): at 00:20

## 2019-06-02 RX ADMIN — AMPICILLIN SODIUM AND SULBACTAM SODIUM 200 GRAM(S): 250; 125 INJECTION, POWDER, FOR SUSPENSION INTRAMUSCULAR; INTRAVENOUS at 23:14

## 2019-06-02 RX ADMIN — Medication 100 MILLIGRAM(S): at 03:58

## 2019-06-02 RX ADMIN — Medication 650 MILLIGRAM(S): at 01:10

## 2019-06-02 NOTE — PHYSICAL THERAPY INITIAL EVALUATION ADULT - PRECAUTIONS/LIMITATIONS, REHAB EVAL
His wife did not notice him get out of bed or going to the bathroom between 1 am - 6:40 am. He has no history of stroke or TIA. Patient is complaint with Xarelto. NIHSS was 17 at [facility] and was 20 at [facility] and mrs was 0. tpa was not given as patient was out of window and was on Xarelto.  At [facility] found to have right proximal M1 occlusion. Patient had CT head and CT perfusion done at [facility], which showed early signs of RMCA infarction and core infarct of 152 cc. Patient is not candidate for intervention as patient has large core infarct.   CT head:  Large acute right middle cerebral artery infarct. No hemorrhage.

## 2019-06-02 NOTE — PROGRESS NOTE ADULT - ASSESSMENT
74-year-old right-handed Estonian gentleman first evaluated at [facility] on 5/31/19 with left hemiparesis. His son provided the history and also translated. He has a history of atrial fibrillation, on Xarelto. On 5/31/19, in the morning, he had difficulty getting out of bed due to severe left hemiparesis.     Impression. On 5/31/19 he developed acute left hemiparesis, due to a large right MCA infarct with hemorrhagic conversion, most likely due to cardioembolism related to atrial fibrillation, with right carotid dissection or atherosclerosis being less likely.    NEURO: Neurologically without acute change, Continue close monitoring for neurologic deterioration in setting of cerebral edema with mass effect for possible hemicraniectomy, although at his age, the risks and benefits of hemicraniectomy may favor survival but probably not good functional outcome, permissive HTN to gradual normotension, LDL pending-titrate statin to LDL goal less than 70, MRI Brain w/o contrast, MRA Head and Neck w/ T1 fat sat pending. Physical therapy/OT recommend acute rehab.     ANTITHROMBOTIC THERAPY: ASA on hold due to hemorrhagic conversion. May switch to Apixaban in 14 days from admission (6/14/19) in setting of atrial fibrillation and pending clinical course.    PULMONARY: protecting airway, saturating well, CXR negative for pulmonary process.     CARDIOVASCULAR: check TTE to evaluate for valvular heart disease, cardiac monitoring shows rapid afib at times. Given IV Metoprolol 5mg PRN.                         SBP goal: <160 in setting of hemorrhagic conversion     GASTROINTESTINAL:  dysphagia screen failed by S&S- NGT placed for feedings. Concern for cholecystitis as elevated total bilirubin. Continue to trend LFTs and RUQ US ordered.      Diet: NPO with TF via NGT    RENAL: BUN/Cr without acute change, good urine output ; continue on 2% NS for induced hypernatremia, consider titrating off starting 6/3 if stable.      Na Goal: 145-155     Hall: No    HEMATOLOGY: H/H without acute change, Platelets 198     DVT ppx: LMWH on hold due to hemorrhagic conversion- Venodynes for now.    ID: febrile, mild leukocytosis, ID following. Differential include cholecystitis, aspiration PNA, central fever from infarct.  Recommended to start Unasyn 3 gm iv q6. Blood cultures pending. CT C/A/P ordered to r/o infection.     OTHER: Plan endorsed to patient, son at the bedside, all questions and concerns addressed.    DISPOSITION: Acute rehab once stable and workup is complete    CORE MEASURES:        Admission NIHSS: 17     TPA: NO      LDL/HDL: 78/54     Depression Screen: p     Statin Therapy: YES     Dysphagia Screen: FAIL     Smoking NO      Afib YES     Stroke Education YES    Obtain screening lower extremity venous ultrasound in patients who meet 1 or more of the following criteria as patient is high risk for DVT/PE on admission:   [] History of DVT/PE  []Hypercoagulable states (Factor V Leiden, Cancer, OCP, etc. )  []Prolonged immobility (hemiplegia/hemiparesis/post operative or any other extended immobilization)  [] Transferred from outside facility (Rehab or Long term care)  [] Age </= to 50

## 2019-06-02 NOTE — PHYSICAL THERAPY INITIAL EVALUATION ADULT - PERTINENT HX OF CURRENT PROBLEM, REHAB EVAL
Pt is a [age]y/o male admitted to [facility] on 5/31/19 h/o Afib on Xarelto, HTN, HLD who presents to the ED for eval of left sided weakness. Patient was later transfer to NS for further management and possible intervention. Patient was last seen to be normal at 1 am on 5/31 when he finished watching TV with his son and went up stairs to bed. His wife woke up at 6:40 am today and tried to wake him and found him to be weak on the left side and with severely slurred speech so he was brought to the ED.

## 2019-06-02 NOTE — CONSULT NOTE ADULT - ASSESSMENT
74 year old right handed Danish male with a history of Afib on Xarelto, HTN, HLD who presents to the ED for eval of left sided weakness admitted fro large Rt MCa infarct.  Since admission noted with uptrending leukocytosis to 17, overnight febrile to 101F. CXr RVP, UA negative  Prior to admission did not have fever, sore throat, cough, diarrhea, urinary symptoms,     Overall acute Rt MCA infarct, leukocytosis, fever, hyperbilirubinemia  On cefuroxime/flagyl?  f/u BCX  check PCT  May need pan scan  Fever could be 2/2 cva 74 year old right handed Serbian male with a history of Afib on Xarelto, HTN, HLD who presents to the ED for eval of left sided weakness admitted fro large Rt MCa infarct.  Since admission noted with uptrending leukocytosis to 17, overnight febrile to 101F. CXr RVP, UA negative  Prior to admission did not have fever, sore throat, cough, diarrhea, urinary symptoms  Bilirubin is slightly elevated and has been rising. would look for abdominal source of fever. Other etiologies could be aspiration pneumonia or reactive from massive stroke    Overall acute Rt MCA infarct, leukocytosis, fever, hyperbilirubinemia  On cefuroxime/flagyl-would Dc this and start unasyn 3 g iv q6h  Agree with USg abomen  f/u BCX  check Procalcitonin

## 2019-06-02 NOTE — PROGRESS NOTE ADULT - SUBJECTIVE AND OBJECTIVE BOX
THE PATIENT WAS SEEN AND EXAMINED BY ME WITH THE HOUSESTAFF AND STROKE TEAM DURING MORNING ROUNDS.   HPI:  74-year-old right-handed Pashto gentleman first evaluated at [facility] on 5/31/19 with left hemiparesis. His son provided the history and also translated. He has a history of atrial fibrillation, on Xarelto. On 5/31/19, in the morning, he had difficulty getting out of bed due to severe left hemiparesis.     ROS otherwise negative.     SUBJECTIVE: Noted with fever overnight.  No new neurologic complaints.      acetaminophen   Tablet .. 650 milliGRAM(s) Oral every 6 hours PRN  atorvastatin 20 milliGRAM(s) Oral at bedtime  cefuroxime  IVPB 750 milliGRAM(s) IV Intermittent every 8 hours  dextrose 50% Injectable 12.5 Gram(s) IV Push once  glucagon  Injectable 1 milliGRAM(s) IntraMuscular once PRN  insulin lispro (HumaLOG) corrective regimen sliding scale   SubCutaneous every 6 hours  metoprolol tartrate Injectable 5 milliGRAM(s) IV Push every 6 hours PRN  metroNIDAZOLE  IVPB      metroNIDAZOLE  IVPB 500 milliGRAM(s) IV Intermittent every 8 hours  sodium chloride 2% . 1000 milliLiter(s) IV Continuous <Continuous>    PHYSICAL EXAM:   Vital Signs Last 24 Hrs  T(C): 36.6 (02 Jun 2019 08:00), Max: 39.2 (01 Jun 2019 18:00)  T(F): 97.8 (02 Jun 2019 08:00), Max: 102.6 (01 Jun 2019 18:00)  HR: 100 (02 Jun 2019 14:00) (89 - 126)  BP: 168/108 (02 Jun 2019 14:00) (114/93 - 173/106)  BP(mean): 127 (02 Jun 2019 14:00) (95 - 127)  RR: 23 (02 Jun 2019 14:00) (17 - 25)  SpO2: 97% (02 Jun 2019 14:00) (92% - 99%)    General: No acute distress  HEENT: EOM intact, visual fields full  Abdomen: Soft, nontender, nondistended   Extremities: No edema    NEUROLOGICAL EXAM:  Mental status: Alert with probable at least mild left babak-neglect; anosognosia for left hemiparesis; Asomatognosia;  Cranial Nerves: decreased blink to threat on left; dense left gaze palsy; moderate left central facial palsy; severe dysarthria-unintelligible  Motor exam: left side-no movement; sensory-mild-moderately decreased grimace to noxious stimuli on left;  Sensation: Intact to light touch   Coordination/ Gait: No dysmetria, gait not tested  LABS:                        15.7   17.3  )-----------( 198      ( 02 Jun 2019 00:56 )             48.7    06-02    148<H>  |  116<H>  |  21  ----------------------------<  192<H>  4.4   |  19<L>  |  0.83    Ca    8.7      02 Jun 2019 08:23    TPro  6.3  /  Alb  3.2<L>  /  TBili  2.2<H>  /  DBili  0.3<H>  /  AST  24  /  ALT  14  /  AlkPhos  46  06-02    Hemoglobin A1C, Whole Blood: 7.1 % (06-01 @ 11:02)    IMAGING: Reviewed by me.     MRI Head No Cont (06.01.19)  Evolving right MCA infarct with new susceptibility on SWI and GRE in the   right lentiform nuclei, posterior limb internal capsule and thalamus   concerning for hemorrhagic transformation. Continued edema and mass   effect with effacement of the right lateral ventricle and 6.5 mm leftward   shift.    CT Head No Cont (05.31.19 19:41)  Interval demarcation of an extensive acute right MCA territory infarct.   No CT evidence for hemorrhagic transformation.   New mass effect upon the right lateral ventricle, no midline shift or   effacement of the basal cisterns.    CT Perfusion (05.31.19 09:30)  There is a large core infarct measuring 152 ml, delayed mean transit   time 240 ml  CBF<30% volume: 152 ml  T max>6.0s volume: 240 ml  Mismatch volume: 88 ml  Mismatch ratio: 1.6  CBV<34% volume: 139 ml    CT Head No Cont (05.31.19 09:30)  Large acute right middle cerebral artery infarct. No   hemorrhage. THE PATIENT WAS SEEN AND EXAMINED BY ME WITH THE HOUSESTAFF AND STROKE TEAM DURING MORNING ROUNDS.   HPI:  74-year-old right-handed Yakut gentleman first evaluated at [facility] on 5/31/19 with left hemiparesis. His son provided the history and also translated. He has a history of atrial fibrillation, on Xarelto. On 5/31/19, in the morning, he had difficulty getting out of bed due to severe left hemiparesis.     ROS otherwise negative.     SUBJECTIVE: Noted with fever overnight.  No new neurologic complaints.       ID #[id number]    acetaminophen   Tablet .. 650 milliGRAM(s) Oral every 6 hours PRN  atorvastatin 20 milliGRAM(s) Oral at bedtime  cefuroxime  IVPB 750 milliGRAM(s) IV Intermittent every 8 hours  dextrose 50% Injectable 12.5 Gram(s) IV Push once  glucagon  Injectable 1 milliGRAM(s) IntraMuscular once PRN  insulin lispro (HumaLOG) corrective regimen sliding scale   SubCutaneous every 6 hours  metoprolol tartrate Injectable 5 milliGRAM(s) IV Push every 6 hours PRN  metroNIDAZOLE  IVPB      metroNIDAZOLE  IVPB 500 milliGRAM(s) IV Intermittent every 8 hours  sodium chloride 2% . 1000 milliLiter(s) IV Continuous <Continuous>    PHYSICAL EXAM:   Vital Signs Last 24 Hrs  T(C): 36.6 (02 Jun 2019 08:00), Max: 39.2 (01 Jun 2019 18:00)  T(F): 97.8 (02 Jun 2019 08:00), Max: 102.6 (01 Jun 2019 18:00)  HR: 100 (02 Jun 2019 14:00) (89 - 126)  BP: 168/108 (02 Jun 2019 14:00) (114/93 - 173/106)  BP(mean): 127 (02 Jun 2019 14:00) (95 - 127)  RR: 23 (02 Jun 2019 14:00) (17 - 25)  SpO2: 97% (02 Jun 2019 14:00) (92% - 99%)    General: No acute distress  HEENT: EOM intact, visual fields full  Abdomen: Soft, nontender, nondistended   Extremities: No edema    NEUROLOGICAL EXAM:  Mental status: Alert with probable at least mild left babak-neglect; anosognosia for left hemiparesis; Asomatognosia;  Cranial Nerves: decreased blink to threat on left; dense left gaze palsy; moderate left central facial palsy; severe dysarthria-unintelligible  Motor exam: left side-no movement; sensory-mild-moderately decreased grimace to noxious stimuli on left;  Sensation: Intact to light touch   Coordination/ Gait: No dysmetria, gait not tested  LABS:                        15.7   17.3  )-----------( 198      ( 02 Jun 2019 00:56 )             48.7    06-02    148<H>  |  116<H>  |  21  ----------------------------<  192<H>  4.4   |  19<L>  |  0.83    Ca    8.7      02 Jun 2019 08:23    TPro  6.3  /  Alb  3.2<L>  /  TBili  2.2<H>  /  DBili  0.3<H>  /  AST  24  /  ALT  14  /  AlkPhos  46  06-02    Hemoglobin A1C, Whole Blood: 7.1 % (06-01 @ 11:02)    IMAGING: Reviewed by me.     MRI Head No Cont (06.01.19)  Evolving right MCA infarct with new susceptibility on SWI and GRE in the   right lentiform nuclei, posterior limb internal capsule and thalamus   concerning for hemorrhagic transformation. Continued edema and mass   effect with effacement of the right lateral ventricle and 6.5 mm leftward   shift.    CT Head No Cont (05.31.19 19:41)  Interval demarcation of an extensive acute right MCA territory infarct.   No CT evidence for hemorrhagic transformation.   New mass effect upon the right lateral ventricle, no midline shift or   effacement of the basal cisterns.    CT Perfusion (05.31.19 09:30)  There is a large core infarct measuring 152 ml, delayed mean transit   time 240 ml  CBF<30% volume: 152 ml  T max>6.0s volume: 240 ml  Mismatch volume: 88 ml  Mismatch ratio: 1.6  CBV<34% volume: 139 ml    CT Head No Cont (05.31.19 09:30)  Large acute right middle cerebral artery infarct. No   hemorrhage.

## 2019-06-02 NOTE — PHYSICAL THERAPY INITIAL EVALUATION ADULT - ADDITIONAL COMMENTS
Pt lives with spouse in a private house with 3 steps to enter and one flight of steps inside. Pt was Ind with all ADLs and amb without AD.

## 2019-06-02 NOTE — CONSULT NOTE ADULT - SUBJECTIVE AND OBJECTIVE BOX
Chief Complaint:  Patient is a 74y old  Male who presents with a chief complaint of Right MCA infarct (2019 10:29)    Atrial fibrillation  HTN (hypertension)  DM (diabetes mellitus)  HTN (hypertension)  Atrial fibrillation  No significant past surgical history  Nephrolithiasis  Hyperlipidemia  H/O: HTN (hypertension)     HPI:  74 year old right handed Telugu male with a history of Afib on Xarelto, HTN, HLD who presents to the ED for eval of left sided weakness. Patient was later transfer to NS for further management and possible intervention. Patient was last seen to be normal at 1 am on  when he finished watching TV with his son and went up stairs to bed. His wife woke up at 6:40 am today and tried to wake him and found him to be weak on the left side and with severely slurred speech so he was brought to the ED. His wife did not notice him get out of bed or going to the bathroom between 1 am - 6:40 am. He has no history of stroke or TIA. Patient is complaint with Xarelto. NIHSS was 17 at [facility] and was 20 at [facility] and mrs was 0. tpa was not given as patient was out of window and was on Xarelto. Patient at [facility] found to have right proximal M1 occlusion. Patient had CT head and CT perfusion done at [facility], which showed early signs of RMCA infarction and core infarct of 152 cc. Patient is not candidate for intervention as patient has large core infarct.   GI consulted for increased liver enzymes The patient is currently nonverbal/lethargic 2/2 CVA. Son bedside aided in history. From chart review the patient was febrile overnight to 102.8 orally, increase in WBC and noted to have an increase liver enzymes and bilirubin. The son bedside states that when him and his mom have been here over the past few days, he has no episodes of nausea or vomiting. Last vomiting was on admission and per son possibly the day after. Pt is nonverbal but when palpated abdomen was without any indicators of pain. The son reports his dad has h/o GERD but no history of gi bleeding in the past; no melena and no hematochezia or hematemesis. EGD notable for reflux per son and Colonoscopy was for screening and otherwise normal from what he can recall; unclear when those procedures were done per son.       No Known Allergies      acetaminophen   Tablet .. 650 milliGRAM(s) Oral every 6 hours PRN  aspirin  chewable 81 milliGRAM(s) Oral daily  atorvastatin 20 milliGRAM(s) Oral at bedtime  cefuroxime  IVPB 750 milliGRAM(s) IV Intermittent every 8 hours  dextrose 50% Injectable 12.5 Gram(s) IV Push once  enoxaparin Injectable 40 milliGRAM(s) SubCutaneous every 24 hours  glucagon  Injectable 1 milliGRAM(s) IntraMuscular once PRN  insulin lispro (HumaLOG) corrective regimen sliding scale   SubCutaneous every 6 hours  metoprolol tartrate Injectable 5 milliGRAM(s) IV Push every 6 hours PRN  metroNIDAZOLE  IVPB      metroNIDAZOLE  IVPB 500 milliGRAM(s) IV Intermittent every 8 hours  sodium chloride 2% . 1000 milliLiter(s) IV Continuous <Continuous>        FAMILY HISTORY:        Review of Systems: *lethargic/nonverbal ; son bedside        Relevant Family History:   n/c    Relevant Social History: n/c      Physical Exam:    Vital Signs:  Vital Signs Last 24 Hrs  T(C): 36.6 (2019 08:00), Max: 39.2 (2019 18:00)  T(F): 97.8 (2019 08:00), Max: 102.6 (2019 18:00)  HR: 95 (2019 10:00) (95 - 126)  BP: 170/98 (2019 10:00) (114/93 - 173/106)  BP(mean): 118 (2019 10:00) (95 - 124)  RR: 20 (2019 10:00) (17 - 25)  SpO2: 92% (2019 10:00) (92% - 99%)  Daily     Daily     General:  Appears stated age, well-groomed, nad  HEENT:  NC/AT,  conjunctivae clear and pink, no thyromegaly, nodules, adenopathy, no JVD  Chest:  Full & symmetric excursion, no increased effort, breath sounds clear  Cardiovascular:  Regular rhythm, S1, S2, no murmur/rub/S3/S4, no abdominal bruit, no edema  Abdomen:  Soft, non-tender, non-distended, normoactive bowel sounds,  no masses ,no hepatosplenomeagaly, no signs of chronic liver disease +NGT  Extremities:  no cyanosis,clubbing or edema  Skin:  No rash/erythema/ecchymoses/petechiae/wounds/abscess/warm/dry  Neuro/Psych:  A&Ox 0  , no asterixis, no tremor, no encephalopathy    Laboratory:                            15.7   17.3  )-----------( 198      ( 2019 00:56 )             48.7     06-    148<H>  |  116<H>  |  21  ----------------------------<  192<H>  4.4   |  19<L>  |  0.83    Ca    8.7      2019 08:23    TPro  6.3  /  Alb  3.2<L>  /  TBili  2.2<H>  /  DBili  0.3<H>  /  AST  24  /  ALT  14  /  AlkPhos  46  -02    LIVER FUNCTIONS - ( 2019 00:56 )  Alb: 3.2 g/dL / Pro: 6.3 g/dL / ALK PHOS: 46 U/L / ALT: 14 U/L / AST: 24 U/L / GGT: x             Urinalysis Basic - ( 2019 12:40 )    Color: Yellow / Appearance: Clear / S.028 / pH: x  Gluc: x / Ketone: Moderate  / Bili: Negative / Urobili: 3 mg/dL   Blood: x / Protein: Trace / Nitrite: Negative   Leuk Esterase: Negative / RBC: 1 /hpf / WBC 1 /HPF   Sq Epi: x / Non Sq Epi: 1 /hpf / Bacteria: Negative        Imaging:    US Abd pending    < from: CT Head No Cont (19 @ 19:41) >    EXAM:  CT BRAIN                            PROCEDURE DATE:  2019            INTERPRETATION:  Noncontrast CT of the brain.    CLINICAL INDICATION: Follow-up large right MCA territory infarct.    TECHNIQUE : Axial CT scanning of the brain was obtained from the skull   base to the vertex without the administration of intravenous contrast.      COMPARISON: CT brain 2019 obtained at 9:13 AM    FINDINGS:      Interval demarcation of an extensive acute right MCA territory infarct.   No CT evidence for hemorrhagic transformation. New mass effect upon the   right lateral ventricle, no midline shift or effacement of the basal   cisterns.    IMPRESSION:    Interval demarcation of an extensive acute right MCA territory infarct.   No CT evidence for hemorrhagic transformation.     New mass effect upon the right lateral ventricle, no midline shift or   effacement of the basal cisterns.                          SEAMUS AZEVEDO M.D., ATTENDING RADIOLOGIST  This document has been electronically signed. May 31 2019  7:55PM                < end of copied text >

## 2019-06-02 NOTE — CONSULT NOTE ADULT - SUBJECTIVE AND OBJECTIVE BOX
HPI:  Patient is a 74 year old right handed Indonesian male with a history of Afib on Xarelto, HTN, HLD who presents to the ED for eval of left sided weakness. Patient was later transfer to NS for further management and possible intervention. Patient was last seen to be normal at 1 am on  when he finished watching TV with his son and went up stairs to bed. His wife woke up at 6:40 am today and tried to wake him and found him to be weak on the left side and with severely slurred speech so he was brought to the ED. His wife did not notice him get out of bed or going to the bathroom between 1 am - 6:40 am. He has no history of stroke or TIA. Patient is complaint with Xarelto. NIHSS was 17 at [facility] and was 20 at [facility] and mrs was 0. tpa was not given as patient was out of window and was on Xarelto. Patient at [facility] found to have right proximal M1 occlusion. Patient had CT head and CT perfusion done at [facility], which showed early signs of RMCA infarction and core infarct of 152 cc. Patient is not candidate for intervention as patient has large core infarct. (31 May 2019 11:00)    ID   ID [id number]  patient well prior to admission, no fever cough sore throat diarrhea urinary symptoms no sick contacts no travel. Sice yesterday noted fever to 101 F, while in MRI started to have coughing    PAST MEDICAL & SURGICAL HISTORY:  Atrial fibrillation  HTN (hypertension)  DM (diabetes mellitus)  HTN (hypertension)  Atrial fibrillation  No significant past surgical history  Nephrolithiasis  Hyperlipidemia  H/O: HTN (hypertension)      Allergies  No Known Allergies        ANTIMICROBIALS:  cefuroxime  IVPB 750 every 8 hours  metroNIDAZOLE  IVPB    metroNIDAZOLE  IVPB 500 every 8 hours      OTHER MEDS: MEDICATIONS  (STANDING):  acetaminophen   Tablet .. 650 every 6 hours PRN  aspirin  chewable 81 daily  atorvastatin 20 at bedtime  dextrose 50% Injectable 12.5 once  enoxaparin Injectable 40 every 24 hours  glucagon  Injectable 1 once PRN  insulin lispro (HumaLOG) corrective regimen sliding scale  every 6 hours  metoprolol tartrate Injectable 5 every 6 hours PRN      SOCIAL HISTORY:  former smoker, occasional etoh    FAMILY HISTORY:  Mom CVA      REVIEW OF SYSTEMS  [  ] ROS unobtainable because:    [x  ] All other systems negative except as noted below:	    Constitutional:  [ ] fever [ ] chills  [ ] weight loss  [ ] weakness  Skin:  [ ] rash [ ] phlebitis	  Eyes: [ ] icterus [ ] pain  [ ] discharge	  ENMT: [ ] sore throat  [ ] thrush [ ] ulcers [ ] exudates  Respiratory: [ ] dyspnea [ ] hemoptysis [ ] cough [ ] sputum	  Cardiovascular:  [ ] chest pain [ ] palpitations [ ] edema	  Gastrointestinal:  [ ] nausea [ ] vomiting [ ] diarrhea [ ] constipation [ ] pain	  Genitourinary:  [ ] dysuria [ ] frequency [ ] hematuria [ ] discharge [ ] flank pain  [ ] incontinence  Musculoskeletal:  [ ] myalgias [ ] arthralgias [ ] arthritis  [ ] back pain  Neurological:  [ ] headache [ ] seizures  [ ] confusion/altered mental status  Psychiatric:  [ ] anxiety [ ] depression	  Hematology/Lymphatics:  [ ] lymphadenopathy  Endocrine:  [ ] adrenal [ ] thyroid  Allergic/Immunologic:	 [ ] transplant [ ] seasonal    Vital Signs Last 24 Hrs  T(F): 97.8 (19 @ 08:00), Max: 102.6 (19 @ 18:00)    Vital Signs Last 24 Hrs  HR: 95 (19 @ 10:00) (89 - 126)  BP: 170/98 (19 @ 10:00) (114/93 - 173/106)  RR: 20 (19 @ 10:00)  SpO2: 92% (19 @ 10:00) (92% - 99%)  Wt(kg): --    PHYSICAL EXAM:  General: non-toxic, NGT  HEAD/EYES: anicteric, PERRL  ENT:  supple  Cardiovascular:   S1, S2  Respiratory:  clear bilaterally  GI:  soft, non-tender, normal bowel sounds  :  no CVA tenderness   Musculoskeletal:  Left sided weakness  Neurologic:  grossly non-focal  Skin:  no rash  Lymph: no lymphadenopathy  Psychiatric:  appropriate affect  Vascular:  no phlebitis                                15.7   17.3  )-----------( 198      ( 2019 00:56 )             48.7           148<H>  |  116<H>  |  21  ----------------------------<  192<H>  4.4   |  19<L>  |  0.83    Ca    8.7      2019 08:23    TPro  6.3  /  Alb  3.2<L>  /  TBili  2.2<H>  /  DBili  0.3<H>  /  AST  24  /  ALT  14  /  AlkPhos  46  -      Urinalysis Basic - ( 2019 12:40 )    Color: Yellow / Appearance: Clear / S.028 / pH: x  Gluc: x / Ketone: Moderate  / Bili: Negative / Urobili: 3 mg/dL   Blood: x / Protein: Trace / Nitrite: Negative   Leuk Esterase: Negative / RBC: 1 /hpf / WBC 1 /HPF   Sq Epi: x / Non Sq Epi: 1 /hpf / Bacteria: Negative        MICROBIOLOGY:          v    Rapid RVP Result: NotDetec ( @ 09:02)          RADIOLOGY: < from: Xray Chest 1 View- PORTABLE-Urgent (19 @ 19:20) >  INTERPRETATION:  NG tube tip in the stomach    < end of copied text >    < from: CT Head No Cont (19 @ 19:41) >  IMPRESSION:    Interval demarcation of an extensive acute right MCA territory infarct.   No CT evidence for hemorrhagic transformation.     New mass effect upon the right lateral ventricle, no midline shift or   effacement of the basal cisterns.    < end of copied text > HPI:  Patient is a 74 year old right handed Icelandic male with a history of Afib on Xarelto, HTN, HLD who presents to the ED for eval of left sided weakness. Patient was later transfer to NS for further management and possible intervention. Patient was last seen to be normal at 1 am on  when he finished watching TV with his son and went up stairs to bed. His wife woke up at 6:40 am today and tried to wake him and found him to be weak on the left side and with severely slurred speech so he was brought to the ED. His wife did not notice him get out of bed or going to the bathroom between 1 am - 6:40 am. He has no history of stroke or TIA. Patient is complaint with Xarelto. NIHSS was 17 at [facility] and was 20 at [facility] and mrs was 0. tpa was not given as patient was out of window and was on Xarelto. Patient at [facility] found to have right proximal M1 occlusion. Patient had CT head and CT perfusion done at [facility], which showed early signs of RMCA infarction and core infarct of 152 cc. Patient is not candidate for intervention as patient has large core infarct. (31 May 2019 11:00)    ID   ID [id number]  patient well prior to admission, no fever cough sore throat diarrhea urinary symptoms no sick contacts no travel. Sice yesterday noted fever to 101 F, while in MRI started to have coughing    PAST MEDICAL & SURGICAL HISTORY:  Atrial fibrillation  HTN (hypertension)  DM (diabetes mellitus)  HTN (hypertension)  Atrial fibrillation  No significant past surgical history  Nephrolithiasis  Hyperlipidemia  H/O: HTN (hypertension)      Allergies  No Known Allergies        ANTIMICROBIALS:  cefuroxime  IVPB 750 every 8 hours  metroNIDAZOLE  IVPB    metroNIDAZOLE  IVPB 500 every 8 hours      OTHER MEDS: MEDICATIONS  (STANDING):  acetaminophen   Tablet .. 650 every 6 hours PRN  aspirin  chewable 81 daily  atorvastatin 20 at bedtime  dextrose 50% Injectable 12.5 once  enoxaparin Injectable 40 every 24 hours  glucagon  Injectable 1 once PRN  insulin lispro (HumaLOG) corrective regimen sliding scale  every 6 hours  metoprolol tartrate Injectable 5 every 6 hours PRN      SOCIAL HISTORY:  former smoker, occasional etoh    FAMILY HISTORY:  Mom CVA      REVIEW OF SYSTEMS  [  ] ROS unobtainable because:    [x  ] All other systems negative except as noted below:	    Constitutional:  [ ] fever [ ] chills  [ ] weight loss  [ ] weakness  Skin:  [ ] rash [ ] phlebitis	  Eyes: [ ] icterus [ ] pain  [ ] discharge	  ENMT: [ ] sore throat  [ ] thrush [ ] ulcers [ ] exudates  Respiratory: [ ] dyspnea [ ] hemoptysis [ ] cough [ ] sputum	  Cardiovascular:  [ ] chest pain [ ] palpitations [ ] edema	  Gastrointestinal:  [ ] nausea [ ] vomiting [ ] diarrhea [ ] constipation [ ] pain	  Genitourinary:  [ ] dysuria [ ] frequency [ ] hematuria [ ] discharge [ ] flank pain  [ ] incontinence  Musculoskeletal:  [ ] myalgias [ ] arthralgias [ ] arthritis  [ ] back pain  Neurological:  [ ] headache [ ] seizures  [ ] confusion/altered mental status  Psychiatric:  [ ] anxiety [ ] depression	  Hematology/Lymphatics:  [ ] lymphadenopathy  Endocrine:  [ ] adrenal [ ] thyroid  Allergic/Immunologic:	 [ ] transplant [ ] seasonal    Vital Signs Last 24 Hrs  T(F): 97.8 (19 @ 08:00), Max: 102.6 (19 @ 18:00)    Vital Signs Last 24 Hrs  HR: 95 (19 @ 10:00) (89 - 126)  BP: 170/98 (19 @ 10:00) (114/93 - 173/106)  RR: 20 (19 @ 10:00)  SpO2: 92% (19 @ 10:00) (92% - 99%)  Wt(kg): --    PHYSICAL EXAM:  General: non-toxic, NGT  HEAD/EYES: anicteric, PERRL  ENT:  supple  Cardiovascular:   S1, S2  Respiratory:  clear bilaterally  GI:  soft, non-tender, normal bowel sounds  :  no CVA tenderness   Musculoskeletal:  Left sided weakness  Neurologic:  grossly non-focal  Skin:  no rash  Lymph: no lymphadenopathy  Psychiatric:  appropriate affect  Vascular:  no phlebitis                                15.7   17.3  )-----------( 198      ( 2019 00:56 )             48.7           148<H>  |  116<H>  |  21  ----------------------------<  192<H>  4.4   |  19<L>  |  0.83    Ca    8.7      2019 08:23    TPro  6.3  /  Alb  3.2<L>  /  TBili  2.2<H>  /  DBili  0.3<H>  /  AST  24  /  ALT  14  /  AlkPhos  46  -      Urinalysis Basic - ( 2019 12:40 )    Color: Yellow / Appearance: Clear / S.028 / pH: x  Gluc: x / Ketone: Moderate  / Bili: Negative / Urobili: 3 mg/dL   Blood: x / Protein: Trace / Nitrite: Negative   Leuk Esterase: Negative / RBC: 1 /hpf / WBC 1 /HPF   Sq Epi: x / Non Sq Epi: 1 /hpf / Bacteria: Negative        MICROBIOLOGY:      Rapid RVP Result: NotDetec ( @ 09:02)          RADIOLOGY: < from: Xray Chest 1 View- PORTABLE-Urgent (19 @ 19:20) >  INTERPRETATION:  NG tube tip in the stomach    < end of copied text >    < from: CT Head No Cont (19 @ 19:41) >  IMPRESSION:    Interval demarcation of an extensive acute right MCA territory infarct.   No CT evidence for hemorrhagic transformation.     New mass effect upon the right lateral ventricle, no midline shift or   effacement of the basal cisterns.    < end of copied text >

## 2019-06-02 NOTE — CONSULT NOTE ADULT - ASSESSMENT
74 year old right handed Azeri male with a history of Afib on Xarelto, HTN, HLD who presents to the ED for eval of left sided weakness found to have CVA and being cared for by neurology. GI consulted for increase in liver enzymes

## 2019-06-03 DIAGNOSIS — Z71.89 OTHER SPECIFIED COUNSELING: ICD-10-CM

## 2019-06-03 DIAGNOSIS — J96.01 ACUTE RESPIRATORY FAILURE WITH HYPOXIA: ICD-10-CM

## 2019-06-03 DIAGNOSIS — G93.40 ENCEPHALOPATHY, UNSPECIFIED: ICD-10-CM

## 2019-06-03 LAB
ALBUMIN SERPL ELPH-MCNC: 3.1 G/DL — LOW (ref 3.3–5)
ALP SERPL-CCNC: 44 U/L — SIGNIFICANT CHANGE UP (ref 40–120)
ALT FLD-CCNC: 22 U/L — SIGNIFICANT CHANGE UP (ref 10–45)
ANION GAP SERPL CALC-SCNC: 12 MMOL/L — SIGNIFICANT CHANGE UP (ref 5–17)
ANION GAP SERPL CALC-SCNC: 14 MMOL/L — SIGNIFICANT CHANGE UP (ref 5–17)
APTT BLD: 28.9 SEC — SIGNIFICANT CHANGE UP (ref 27.5–36.3)
AST SERPL-CCNC: 40 U/L — SIGNIFICANT CHANGE UP (ref 10–40)
BILIRUB SERPL-MCNC: 1.4 MG/DL — HIGH (ref 0.2–1.2)
BUN SERPL-MCNC: 17 MG/DL — SIGNIFICANT CHANGE UP (ref 7–23)
BUN SERPL-MCNC: 18 MG/DL — SIGNIFICANT CHANGE UP (ref 7–23)
CALCIUM SERPL-MCNC: 7.3 MG/DL — LOW (ref 8.4–10.5)
CALCIUM SERPL-MCNC: 8.6 MG/DL — SIGNIFICANT CHANGE UP (ref 8.4–10.5)
CHLORIDE SERPL-SCNC: 111 MMOL/L — HIGH (ref 96–108)
CHLORIDE SERPL-SCNC: 115 MMOL/L — HIGH (ref 96–108)
CO2 SERPL-SCNC: 17 MMOL/L — LOW (ref 22–31)
CO2 SERPL-SCNC: 19 MMOL/L — LOW (ref 22–31)
CREAT SERPL-MCNC: 0.76 MG/DL — SIGNIFICANT CHANGE UP (ref 0.5–1.3)
CREAT SERPL-MCNC: 0.78 MG/DL — SIGNIFICANT CHANGE UP (ref 0.5–1.3)
GAS PNL BLDA: SIGNIFICANT CHANGE UP
GLUCOSE BLDC GLUCOMTR-MCNC: 132 MG/DL — HIGH (ref 70–99)
GLUCOSE BLDC GLUCOMTR-MCNC: 156 MG/DL — HIGH (ref 70–99)
GLUCOSE SERPL-MCNC: 163 MG/DL — HIGH (ref 70–99)
GLUCOSE SERPL-MCNC: 166 MG/DL — HIGH (ref 70–99)
HAV IGM SER-ACNC: SIGNIFICANT CHANGE UP
HBV CORE IGM SER-ACNC: SIGNIFICANT CHANGE UP
HBV SURFACE AG SER-ACNC: SIGNIFICANT CHANGE UP
HCT VFR BLD CALC: 43.6 % — SIGNIFICANT CHANGE UP (ref 39–50)
HCV AB S/CO SERPL IA: 0.61 S/CO — SIGNIFICANT CHANGE UP (ref 0–0.99)
HCV AB SERPL-IMP: SIGNIFICANT CHANGE UP
HGB BLD-MCNC: 14.7 G/DL — SIGNIFICANT CHANGE UP (ref 13–17)
INR BLD: 1.1 RATIO — SIGNIFICANT CHANGE UP (ref 0.88–1.16)
MAGNESIUM SERPL-MCNC: 1.9 MG/DL — SIGNIFICANT CHANGE UP (ref 1.6–2.6)
MCHC RBC-ENTMCNC: 33.2 PG — SIGNIFICANT CHANGE UP (ref 27–34)
MCHC RBC-ENTMCNC: 33.8 GM/DL — SIGNIFICANT CHANGE UP (ref 32–36)
MCV RBC AUTO: 98.1 FL — SIGNIFICANT CHANGE UP (ref 80–100)
PHOSPHATE SERPL-MCNC: 2.2 MG/DL — LOW (ref 2.5–4.5)
PLATELET # BLD AUTO: 189 K/UL — SIGNIFICANT CHANGE UP (ref 150–400)
POTASSIUM SERPL-MCNC: 3.8 MMOL/L — SIGNIFICANT CHANGE UP (ref 3.5–5.3)
POTASSIUM SERPL-MCNC: 4 MMOL/L — SIGNIFICANT CHANGE UP (ref 3.5–5.3)
POTASSIUM SERPL-SCNC: 3.8 MMOL/L — SIGNIFICANT CHANGE UP (ref 3.5–5.3)
POTASSIUM SERPL-SCNC: 4 MMOL/L — SIGNIFICANT CHANGE UP (ref 3.5–5.3)
PROT SERPL-MCNC: 6.4 G/DL — SIGNIFICANT CHANGE UP (ref 6–8.3)
PROTHROM AB SERPL-ACNC: 12.6 SEC — SIGNIFICANT CHANGE UP (ref 10–12.9)
RBC # BLD: 4.44 M/UL — SIGNIFICANT CHANGE UP (ref 4.2–5.8)
RBC # FLD: 11.8 % — SIGNIFICANT CHANGE UP (ref 10.3–14.5)
SODIUM SERPL-SCNC: 142 MMOL/L — SIGNIFICANT CHANGE UP (ref 135–145)
SODIUM SERPL-SCNC: 146 MMOL/L — HIGH (ref 135–145)
WBC # BLD: 13.5 K/UL — HIGH (ref 3.8–10.5)
WBC # FLD AUTO: 13.5 K/UL — HIGH (ref 3.8–10.5)

## 2019-06-03 PROCEDURE — 99291 CRITICAL CARE FIRST HOUR: CPT

## 2019-06-03 PROCEDURE — 99223 1ST HOSP IP/OBS HIGH 75: CPT

## 2019-06-03 PROCEDURE — 71260 CT THORAX DX C+: CPT | Mod: 26

## 2019-06-03 PROCEDURE — 70450 CT HEAD/BRAIN W/O DYE: CPT | Mod: 26

## 2019-06-03 PROCEDURE — 74177 CT ABD & PELVIS W/CONTRAST: CPT | Mod: 26

## 2019-06-03 PROCEDURE — 99497 ADVNCD CARE PLAN 30 MIN: CPT | Mod: 25

## 2019-06-03 RX ORDER — LABETALOL HCL 100 MG
10 TABLET ORAL ONCE
Refills: 0 | Status: COMPLETED | OUTPATIENT
Start: 2019-06-03 | End: 2019-06-03

## 2019-06-03 RX ORDER — MORPHINE SULFATE 50 MG/1
1 CAPSULE, EXTENDED RELEASE ORAL
Qty: 100 | Refills: 0 | Status: DISCONTINUED | OUTPATIENT
Start: 2019-06-03 | End: 2019-06-04

## 2019-06-03 RX ORDER — MIDAZOLAM HYDROCHLORIDE 1 MG/ML
4 INJECTION, SOLUTION INTRAMUSCULAR; INTRAVENOUS ONCE
Refills: 0 | Status: DISCONTINUED | OUTPATIENT
Start: 2019-06-03 | End: 2019-06-03

## 2019-06-03 RX ORDER — METOPROLOL TARTRATE 50 MG
5 TABLET ORAL ONCE
Refills: 0 | Status: COMPLETED | OUTPATIENT
Start: 2019-06-03 | End: 2019-06-03

## 2019-06-03 RX ORDER — MANNITOL
100 POWDER (GRAM) MISCELLANEOUS
Qty: 100 | Refills: 0 | Status: COMPLETED | OUTPATIENT
Start: 2019-06-03 | End: 2019-06-03

## 2019-06-03 RX ORDER — PROPOFOL 10 MG/ML
30 INJECTION, EMULSION INTRAVENOUS
Qty: 1000 | Refills: 0 | Status: DISCONTINUED | OUTPATIENT
Start: 2019-06-03 | End: 2019-06-03

## 2019-06-03 RX ORDER — LOSARTAN POTASSIUM 100 MG/1
25 TABLET, FILM COATED ORAL DAILY
Refills: 0 | Status: DISCONTINUED | OUTPATIENT
Start: 2019-06-03 | End: 2019-06-03

## 2019-06-03 RX ORDER — MORPHINE SULFATE 50 MG/1
1 CAPSULE, EXTENDED RELEASE ORAL
Qty: 100 | Refills: 0 | Status: DISCONTINUED | OUTPATIENT
Start: 2019-06-03 | End: 2019-06-03

## 2019-06-03 RX ORDER — MANNITOL
100 POWDER (GRAM) MISCELLANEOUS
Qty: 100 | Refills: 0 | Status: DISCONTINUED | OUTPATIENT
Start: 2019-06-03 | End: 2019-06-03

## 2019-06-03 RX ORDER — SODIUM CHLORIDE 9 MG/ML
1000 INJECTION INTRAMUSCULAR; INTRAVENOUS; SUBCUTANEOUS ONCE
Refills: 0 | Status: COMPLETED | OUTPATIENT
Start: 2019-06-03 | End: 2019-06-03

## 2019-06-03 RX ORDER — FENTANYL CITRATE 50 UG/ML
100 INJECTION INTRAVENOUS ONCE
Refills: 0 | Status: DISCONTINUED | OUTPATIENT
Start: 2019-06-03 | End: 2019-06-03

## 2019-06-03 RX ADMIN — MIDAZOLAM HYDROCHLORIDE 4 MILLIGRAM(S): 1 INJECTION, SOLUTION INTRAMUSCULAR; INTRAVENOUS at 09:55

## 2019-06-03 RX ADMIN — FENTANYL CITRATE 100 MICROGRAM(S): 50 INJECTION INTRAVENOUS at 10:00

## 2019-06-03 RX ADMIN — Medication 5 MILLIGRAM(S): at 04:30

## 2019-06-03 RX ADMIN — LOSARTAN POTASSIUM 25 MILLIGRAM(S): 100 TABLET, FILM COATED ORAL at 06:21

## 2019-06-03 RX ADMIN — Medication 10 MILLIGRAM(S): at 00:17

## 2019-06-03 RX ADMIN — FENTANYL CITRATE 100 MICROGRAM(S): 50 INJECTION INTRAVENOUS at 10:15

## 2019-06-03 RX ADMIN — Medication 500 GM/HR: at 09:10

## 2019-06-03 RX ADMIN — SODIUM CHLORIDE 75 MILLILITER(S): 5 INJECTION, SOLUTION INTRAVENOUS at 06:21

## 2019-06-03 RX ADMIN — AMPICILLIN SODIUM AND SULBACTAM SODIUM 200 GRAM(S): 250; 125 INJECTION, POWDER, FOR SUSPENSION INTRAMUSCULAR; INTRAVENOUS at 06:19

## 2019-06-03 RX ADMIN — Medication 5 MILLIGRAM(S): at 09:45

## 2019-06-03 RX ADMIN — SODIUM CHLORIDE 2000 MILLILITER(S): 9 INJECTION INTRAMUSCULAR; INTRAVENOUS; SUBCUTANEOUS at 10:10

## 2019-06-03 NOTE — CONSULT NOTE ADULT - ASSESSMENT
73 Y/O hx of afib on xaralto, admitted to [facility] with slurred speech and ams,  [facility]H [facility] found to have right proximal M1 occlusion. Patient had CT head and CT perfusion done at [facility], which showed early signs of RMCA infarction and core infarct Patient is not candidate for intervention as patient has large core infarct. Repeat CTH today with evolving right basal ganglia area of hemorrhagic transformation with large right MCA infarction with noted worsening mass effect and leftward shift, called for goals of care

## 2019-06-03 NOTE — CHART NOTE - NSCHARTNOTEFT_GEN_A_CORE
Pt intubated, transfer to NSCU  DNR now  Comfort measures in place  off abx    Will sign off, recall ID if needed #[phone].    Donald Varghese  Attending Physician   Division of Infectious Disease  Pager #[phone]  After 5pm/weekend or no response, call #[phone]

## 2019-06-03 NOTE — DIETITIAN INITIAL EVALUATION ADULT. - ENERGY NEEDS
Ht: 67"  Wt: 174  BMI: 27.4  kg/m2   IBW: 148  (+/-10%)     117% IBW  Edema: none       Skin: no pressure injuries documented

## 2019-06-03 NOTE — CONSULT NOTE ADULT - SUBJECTIVE AND OBJECTIVE BOX
HPI:  Patient is a 74 year old right handed Japanese male with a history of Afib on Xarelto, HTN, HLD who presents to the ED for eval of left sided weakness. Patient was later transfer to NS for further management and possible intervention. Patient was last seen to be normal at 1 am on  when he finished watching TV with his son and went up stairs to bed. His wife woke up at 6:40 am today and tried to wake him and found him to be weak on the left side and with severely slurred speech so he was brought to the ED. His wife did not notice him get out of bed or going to the bathroom between 1 am - 6:40 am. He has no history of stroke or TIA. Patient is complaint with Xarelto. NIHSS was 17 at [facility] and was 20 at [facility] and mrs was 0. tpa was not given as patient was out of window and was on Xarelto. Patient at [facility] found to have right proximal M1 occlusion. Patient had CT head and CT perfusion done at [facility], which showed early signs of RMCA infarction and core infarct of 152 cc. Patient is not candidate for intervention as patient has large core infarct. (31 May 2019 11:00)    PERTINENT PM/SXH:   Atrial fibrillation  HTN (hypertension)  DM (diabetes mellitus)  HTN (hypertension)  Atrial fibrillation    No significant past surgical history  Nephrolithiasis  Hyperlipidemia  H/O: HTN (hypertension)    FAMILY HISTORY:    ITEMS NOT CHECKED ARE NOT PRESENT    SOCIAL HISTORY:   Significant other/partner:  [ x]  Children:  [ x]  Temple/Spirituality:  unknown   Substance hx:  [ ]   Tobacco hx:  [ ]   Alcohol hx: [ ]   Home Opioid hx:  [ ] I-Stop Reference No:  Living Situation: [x ]Home  [ ]Long term care  [ ]Rehab [ ]Other    ADVANCE DIRECTIVES:    DNR  Yes  MOLST  [x ]  Living Will  [ ]   DECISION MAKER(s):  [ ] Health Care Proxy(s)  [x ] Surrogate(s)  [ ] Guardian           Name(s): Phone Number(s):  Raffi Wren wife is surrogate defers to son Rito   BASELINE (I)ADL(s) (prior to admission):  [place]: [x ]Total  [ ] Moderate [ ]Dependent    Allergies    No Known Allergies    Intolerances    MEDICATIONS  (STANDING):  ampicillin/sulbactam  IVPB      ampicillin/sulbactam  IVPB 3 Gram(s) IV Intermittent every 6 hours  atorvastatin 20 milliGRAM(s) Oral at bedtime  dextrose 50% Injectable 12.5 Gram(s) IV Push once  fentaNYL    Injectable 100 MICROGram(s) IV Push once  insulin lispro (HumaLOG) corrective regimen sliding scale   SubCutaneous every 6 hours  losartan 25 milliGRAM(s) Oral daily  metoprolol tartrate Injectable 5 milliGRAM(s) IV Push once  midazolam Injectable 4 milliGRAM(s) IV Push once  sodium chloride 2% . 1000 milliLiter(s) (75 mL/Hr) IV Continuous <Continuous>    MEDICATIONS  (PRN):  acetaminophen   Tablet .. 650 milliGRAM(s) Oral every 6 hours PRN Temp greater or equal to 38C (100.4F), Mild Pain (1 - 3), Moderate Pain (4 - 6)  glucagon  Injectable 1 milliGRAM(s) IntraMuscular once PRN Glucose LESS THAN 70 milligrams/deciliter  metoprolol tartrate Injectable 5 milliGRAM(s) IV Push every 6 hours PRN give for HR >110 Hold for SBP <100    PRESENT SYMPTOMS: [x ]Unable to obtain due to poor mentation   Source if other than patient:  [ ]Family   [ ]Team     Pain (Impact on QOL):    Location -         Minimal acceptable level (0-10 scale):                    Aggrevating factors -  Quality -  Radiation -  Severity (0-10 scale) -    Timing -    PAIN AD Score: 0    [email or web address] (press ctrl +  left click to view)    Dyspnea:                           [ ]Mild [ ]Moderate [ ]Severe  Anxiety:                             [ ]Mild [ ]Moderate [ ]Severe  Fatigue:                             [ ]Mild [ ]Moderate [ ]Severe  Nausea:                             [ ]Mild [ ]Moderate [ ]Severe  Loss of appetite:              [ ]Mild [ ]Moderate [ ]Severe  Constipation:                    [ ]Mild [ ]Moderate [ ]Severe    Other Symptoms:  [ ]All other review of systems negative     Karnofsky Performance Score/Palliative Performance Status Version 2:    20     %  PHYSICAL EXAM:  Vital Signs Last 24 Hrs  T(C): 37.1 (2019 11:00), Max: 37.8 (2019 08:14)  T(F): 98.8 (2019 11:00), Max: 100.1 (2019 08:14)  HR: 99 (2019 11:00) (89 - 152)  BP: 129/89 (2019 11:00) (98/68 - 244/172)  BP(mean): 102 (2019 11:00) (78 - 194)  RR: 18 (2019 11:00) (15 - 34)  SpO2: 99% (2019 11:00) (94% - 100%) I&O's Summary    2019 07:01  -  2019 07:00  --------------------------------------------------------  IN: 1325 mL / OUT: 0 mL / NET: 1325 mL    2019 07:01  -  2019 11:23  --------------------------------------------------------  IN: 75 mL / OUT: 310 mL / NET: -235 mL    GENERAL:  [ ]Alert  [ ]Oriented x   [ ]Lethargic  [ ]Cachexia  [ ]Unarousable  [ ]Verbal  [ ]Non-Verbal  Behavioral:   [ ] Anxiety  [ ] Delirium [ ] Agitation [ ] Other  HEENT:  [ ]Normal   [ ]Dry mouth   [ ]ET Tube/Trach  [ ]Oral lesions  PULMONARY:   [ ]Clear [ ]Tachypnea  [ ]Audible excessive secretions   [ ]Rhonchi        [ ]Right [ ]Left [ ]Bilateral  [ ]Crackles        [ ]Right [ ]Left [ ]Bilateral  [ ]Wheezing     [ ]Right [ ]Left [ ]Bilateral  CARDIOVASCULAR:    [ ]Regular [ ]Irregular [ ]Tachy  [ ]Skinny [ ]Murmur [ ]Other  GASTROINTESTINAL:  [ ]Soft  [ ]Distended   [ ]+BS  [ ]Non tender [ ]Tender  [ ]PEG [ ]OGT/ NGT  Last BM:   GENITOURINARY:  [ ]Normal [ ] Incontinent   [ ]Oliguria/Anuria   [ ]Hall  MUSCULOSKELETAL:   [ ]Normal   [ ]Weakness  [ ]Bed/Wheelchair bound [ ]Edema  NEUROLOGIC:   [ ]No focal deficits  [ ] Cognitive impairment  [ ] Dysphagia [ ]Dysarthria [ ] Paresis [ ]Other   SKIN:   [ ]Normal   [ ]Pressure ulcer(s)  [ ]Rash    CRITICAL CARE:  [ ] Shock Present  [ ]Septic [ ]Cardiogenic [ ]Neurologic [ ]Hypovolemic  [ ]  Vasopressors [ ]  Inotropes   [ ] Respiratory failure present  [ ] Acute  [ ] Chronic [ ] Hypoxic  [ ] Hypercarbic [ ] Other  [ ] Other organ failure     LABS:                        14.7   13.5  )-----------( 189      ( 2019 06:15 )             43.6   06-    142  |  111<H>  |  17  ----------------------------<  166<H>  3.8   |  19<L>  |  0.78    Ca    7.3<L>      2019 10:29  Phos  2.2       Mg     1.9         TPro  6.4  /  Alb  3.1<L>  /  TBili  1.4<H>  /  DBili  x   /  AST  40  /  ALT  22  /  AlkPhos  44  03  PT/INR - ( 2019 10:29 )   PT: 12.6 sec;   INR: 1.10 ratio         PTT - ( 2019 10:29 )  PTT:28.9 sec    Urinalysis Basic - ( 2019 12:40 )    Color: Yellow / Appearance: Clear / S.028 / pH: x  Gluc: x / Ketone: Moderate  / Bili: Negative / Urobili: 3 mg/dL   Blood: x / Protein: Trace / Nitrite: Negative   Leuk Esterase: Negative / RBC: 1 /hpf / WBC 1 /HPF   Sq Epi: x / Non Sq Epi: 1 /hpf / Bacteria: Negative      RADIOLOGY & ADDITIONAL STUDIES:    PROTEIN CALORIE MALNUTRITION:   [ ] PPSV2 < or = to 30% [ ] significant weight loss  [ ] poor nutritional intake [ ] catabolic state [ ] anasarca     Albumin, Serum: 3.1 g/dL (19 @ 06:15)  Artificial Nutrition [ ]     REFERRALS:   [ ]Chaplaincy  [ ] Hospice  [ ]Child Life  [ ]Social Work  [ ]Case management [ ]Holistic Therapy   Goals of Care Discussion Document: HPI:  Patient is a 74 year old right handed Frisian male with a history of Afib on Xarelto, HTN, HLD who presents to the ED for eval of left sided weakness. Patient was later transfer to NS for further management and possible intervention. Patient was last seen to be normal at 1 am on  when he finished watching TV with his son and went up stairs to bed. His wife woke up at 6:40 am today and tried to wake him and found him to be weak on the left side and with severely slurred speech so he was brought to the ED. His wife did not notice him get out of bed or going to the bathroom between 1 am - 6:40 am. He has no history of stroke or TIA. Patient is complaint with Xarelto. NIHSS was 17 at [facility] and was 20 at [facility] and mrs was 0. tpa was not given as patient was out of window and was on Xarelto. PERTINENT PM/SXH:   Atrial fibrillation  HTN (hypertension)  DM (diabetes mellitus)  HTN (hypertension)  Atrial fibrillation    No significant past surgical history  Nephrolithiasis  Hyperlipidemia  H/O: HTN (hypertension)    FAMILY HISTORY:    ITEMS NOT CHECKED ARE NOT PRESENT    SOCIAL HISTORY:   Significant other/partner:  [ x]  Children:  [ x]  Anabaptism/Spirituality:  unknown   Substance hx:  [ ]   Tobacco hx:  [ ]   Alcohol hx: [ ]   Home Opioid hx:  [ ] I-Stop Reference No:  Living Situation: [x ]Home  [ ]Long term care  [ ]Rehab [ ]Other    ADVANCE DIRECTIVES:    DNR  Yes  MOLST  [x ]  Living Will  [ ]   DECISION MAKER(s):  [ ] Health Care Proxy(s)  [x ] Surrogate(s)  [ ] Guardian           Name(s): Phone Number(s):  Raffi Wren wife is surrogate defers to son Rito   BASELINE (I)ADL(s) (prior to admission):  [place]: [x ]Total  [ ] Moderate [ ]Dependent    Allergies    No Known Allergies    Intolerances    MEDICATIONS  (STANDING):  ampicillin/sulbactam  IVPB      ampicillin/sulbactam  IVPB 3 Gram(s) IV Intermittent every 6 hours  atorvastatin 20 milliGRAM(s) Oral at bedtime  dextrose 50% Injectable 12.5 Gram(s) IV Push once  fentaNYL    Injectable 100 MICROGram(s) IV Push once  insulin lispro (HumaLOG) corrective regimen sliding scale   SubCutaneous every 6 hours  losartan 25 milliGRAM(s) Oral daily  metoprolol tartrate Injectable 5 milliGRAM(s) IV Push once  midazolam Injectable 4 milliGRAM(s) IV Push once  sodium chloride 2% . 1000 milliLiter(s) (75 mL/Hr) IV Continuous <Continuous>    MEDICATIONS  (PRN):  acetaminophen   Tablet .. 650 milliGRAM(s) Oral every 6 hours PRN Temp greater or equal to 38C (100.4F), Mild Pain (1 - 3), Moderate Pain (4 - 6)  glucagon  Injectable 1 milliGRAM(s) IntraMuscular once PRN Glucose LESS THAN 70 milligrams/deciliter  metoprolol tartrate Injectable 5 milliGRAM(s) IV Push every 6 hours PRN give for HR >110 Hold for SBP <100    PRESENT SYMPTOMS: [x ]Unable to obtain due to poor mentation   Source if other than patient:  [ ]Family   [ ]Team     Pain (Impact on QOL):    Location -         Minimal acceptable level (0-10 scale):                    Aggrevating factors -  Quality -  Radiation -  Severity (0-10 scale) -    Timing -    PAIN AD Score: 0    [email or web address] (press ctrl +  left click to view)    Dyspnea:                           [ ]Mild [ ]Moderate [ ]Severe  Anxiety:                             [ ]Mild [ ]Moderate [ ]Severe  Fatigue:                             [ ]Mild [ ]Moderate [ ]Severe  Nausea:                             [ ]Mild [ ]Moderate [ ]Severe  Loss of appetite:              [ ]Mild [ ]Moderate [ ]Severe  Constipation:                    [ ]Mild [ ]Moderate [ ]Severe    Other Symptoms:  [ ]All other review of systems negative     Karnofsky Performance Score/Palliative Performance Status Version 2:    20     %  PHYSICAL EXAM:  Vital Signs Last 24 Hrs  T(C): 37.1 (2019 11:00), Max: 37.8 (2019 08:14)  T(F): 98.8 (2019 11:00), Max: 100.1 (2019 08:14)  HR: 99 (2019 11:00) (89 - 152)  BP: 129/89 (2019 11:00) (98/68 - 244/172)  BP(mean): 102 (2019 11:00) (78 - 194)  RR: 18 (2019 11:00) (15 - 34)  SpO2: 99% (2019 11:00) (94% - 100%) I&O's Summary    2019 07:01  -  2019 07:00  --------------------------------------------------------  IN: 1325 mL / OUT: 0 mL / NET: 1325 mL    2019 07:01  -  2019 11:23  --------------------------------------------------------  IN: 75 mL / OUT: 310 mL / NET: -235 mL    GENERAL:  [ ]Alert  [ ]Oriented x   [ ]Lethargic  [ ]Cachexia  [x ]Unarousable  [ ]Verbal  [x ]Non-Verbal  Behavioral:   [ ] Anxiety  [ ] Delirium [ ] Agitation [ ] Other  HEENT:  [ ]Normal   [ ]Dry mouth   [x ]ET Tube/Trach  [ ]Oral lesions  PULMONARY: VENTED   [ ]Clear [ ]Tachypnea  [ ]Audible excessive secretions   [ ]Rhonchi        [ ]Right [ ]Left [ ]Bilateral  [ ]Crackles        [ ]Right [ ]Left [ ]Bilateral  [ ]Wheezing     [ ]Right [ ]Left [ ]Bilateral  CARDIOVASCULAR:    [ ]Regular [X ]Irregular [X ]Tachy  [ ]Skinny [ ]Murmur [ ]Other  GASTROINTESTINAL:  [X ]Soft  [ ]Distended   [X ]+BS  [ ]Non tender [ ]Tender  [ ]PEG [ ]OGT/ NGT  Last BM:   GENITOURINARY:  [ ]Normal [ ] Incontinent   [ ]Oliguria/Anuria   [X ]Hall  MUSCULOSKELETAL:   [ ]Normal   [ ]Weakness  [X ]Bed/Wheelchair bound [ ]Edema  NEUROLOGIC:   [ ]No focal deficits  [X ] Cognitive impairment  [ ] Dysphagia [ ]Dysarthria [ ] Paresis [ ]Other   SKIN:   [ ]Normal   [ ]Pressure ulcer(s)  [ ]Rash    CRITICAL CARE:  [ ] Shock Present  [ ]Septic [ ]Cardiogenic [ ]Neurologic [ ]Hypovolemic  [ ]  Vasopressors [ ]  Inotropes   [ ] Respiratory failure present  [ ] Acute  [ ] Chronic [ ] Hypoxic  [ ] Hypercarbic [ ] Other  [ ] Other organ failure     LABS:                        14.7   13.5  )-----------( 189      ( 2019 06:15 )             43.6   06-03    142  |  111<H>  |  17  ----------------------------<  166<H>  3.8   |  19<L>  |  0.78    Ca    7.3<L>      2019 10:29  Phos  2.2     06-  Mg     1.9     06-03    TPro  6.4  /  Alb  3.1<L>  /  TBili  1.4<H>  /  DBili  x   /  AST  40  /  ALT  22  /  AlkPhos  44  06-03  PT/INR - ( 2019 10:29 )   PT: 12.6 sec;   INR: 1.10 ratio         PTT - ( 2019 10:29 )  PTT:28.9 sec    Urinalysis Basic - ( 2019 12:40 )    Color: Yellow / Appearance: Clear / S.028 / pH: x  Gluc: x / Ketone: Moderate  / Bili: Negative / Urobili: 3 mg/dL   Blood: x / Protein: Trace / Nitrite: Negative   Leuk Esterase: Negative / RBC: 1 /hpf / WBC 1 /HPF   Sq Epi: x / Non Sq Epi: 1 /hpf / Bacteria: Negative      RADIOLOGY & ADDITIONAL STUDIES:    INTERPRETATION:  Clinical indication: Change in mental status. Stroke.    Technique: Noncontrast CT of the head was performed.    Multiple contiguous axial images were acquired from the skull base to the   vertex without the administration of intravenous contrast. Coronal and   sagittal reformations were made.    COMPARISON: CT head dated 2018.    FINDINGS:  A large right MCA territory infarction seen as lossof cortical medullary   distinction and sulcal effacement involving the right frontal, temporal,   and parietal lobes is again seen. There is no hyperdensity within the   right basal ganglia measuring 1.2 x 0.7 cm, consistent with small amounts   of hemorrhagic transformation along the right basal ganglia region.    Leftward midline shift now measures up to 1.2 cm.    Hyperdensity along the right supraclinoid internal carotid artery and MCA   is unchanged.    There is otherwise a slightly increased size of the left lateral   ventricle which may represent developing left hydrocephalus.     The calvarium is intact. The visualized intraorbital compartments,   paranasal sinuses and tympanomastoid cavities appear free of acute   disease.      IMPRESSION:  1.2 x 0.7 cm right basal ganglia area of hemorrhagic transformation along   large right MCA territory infarction.    Increased mass effect associated with right MCA territory infarction with   1.2 cm leftward midline shift.    Slight increased left lateral ventricular size which may signify   developing hydrocephalus.                  PROTEIN CALORIE MALNUTRITION:   [ ] PPSV2 < or = to 30% [ ] significant weight loss  [ ] poor nutritional intake [ ] catabolic state [ ] anasarca     Albumin, Serum: 3.1 g/dL (19 @ 06:15)  Artificial Nutrition [ ]     REFERRALS:   [ ]Chaplaincy  [ ] Hospice  [ ]Child Life  [ ]Social Work  [ ]Case management [ ]Holistic Therapy   Goals of Care Discussion Document:

## 2019-06-03 NOTE — CONSULT NOTE ADULT - SUBJECTIVE AND OBJECTIVE BOX
p ([id number])     HPI:  Patient is a 74 year old right handed Welsh male with a history of Afib on Xarelto, HTN, HLD who presents to the ED for eval of left sided weakness. Patient was later transfer to NS for further management and possible intervention. Patient was last seen to be normal at 1 am on 5/31 when he finished watching TV with his son and went up stairs to bed. His wife woke up at 6:40 am today and tried to wake him and found him to be weak on the left side and with severely slurred speech so he was brought to the ED. His wife did not notice him get out of bed or going to the bathroom between 1 am - 6:40 am. He has no history of stroke or TIA. Patient is complaint with Xarelto. NIHSS was 17 at [facility] and was 20 at [facility] and mrs was 0. tpa was not given as patient was out of window and was on Xarelto. Patient at [facility] found to have right proximal M1 occlusion. Patient had CT head and CT perfusion done at [facility], which showed early signs of RMCA infarction and core infarct of 152 cc. Patient is not candidate for intervention as patient has large core infarct. (31 May 2019 11:00)    Exam:  Eyes closed, Weakly localizes RUE  L side nothing  R pupil 5 and fixed, L 3 and fixed      --Anticoagulation:    =====================  PAST MEDICAL HISTORY   Atrial fibrillation  HTN (hypertension)  DM (diabetes mellitus)  HTN (hypertension)  Atrial fibrillation    PAST SURGICAL HISTORY   No significant past surgical history  Nephrolithiasis  Hyperlipidemia  H/O: HTN (hypertension)        MEDICATIONS:  Antibiotics:  ampicillin/sulbactam  IVPB      ampicillin/sulbactam  IVPB 3 Gram(s) IV Intermittent every 6 hours    Neuro:  acetaminophen   Tablet .. 650 milliGRAM(s) Oral every 6 hours PRN    Other:  atorvastatin 20 milliGRAM(s) Oral at bedtime  dextrose 50% Injectable 12.5 Gram(s) IV Push once  glucagon  Injectable 1 milliGRAM(s) IntraMuscular once PRN  insulin lispro (HumaLOG) corrective regimen sliding scale   SubCutaneous every 6 hours  losartan 25 milliGRAM(s) Oral daily  mannitol Infusion 100 Gm/Hr IV Continuous <Continuous>  metoprolol tartrate Injectable 5 milliGRAM(s) IV Push every 6 hours PRN  sodium chloride 2% . 1000 milliLiter(s) IV Continuous <Continuous>      SOCIAL HISTORY:   Occupation:   Marital Status:     FAMILY HISTORY:  No pertinent family history in first degree relatives      ROS: Negative except per HPI    LABS:                          14.7   13.5  )-----------( 189      ( 03 Jun 2019 06:15 )             43.6     06-03    146<H>  |  115<H>  |  18  ----------------------------<  163<H>  4.0   |  17<L>  |  0.76    Ca    8.6      03 Jun 2019 06:15    TPro  6.4  /  Alb  3.1<L>  /  TBili  1.4<H>  /  DBili  x   /  AST  40  /  ALT  22  /  AlkPhos  44  06-03

## 2019-06-03 NOTE — SPEECH LANGUAGE PATHOLOGY EVALUATION - SLP DIAGNOSIS
SLP attempted to see Pt for speech language evaluation however Pt is currently intubated with medical staff at b/s.  Therefore, this service to sign off.  Request MD to reconsult this department as Pt's status improves.  Per Palliative Care NP, Pt transfer to PCU is pending. SLP attempted to see Pt for speech language evaluation however Pt is currently intubated with medical staff at b/s.  Therefore, this service to sign off.  Per Palliative Care NP, Pt transfer to PCU is pending.

## 2019-06-03 NOTE — PROGRESS NOTE ADULT - ASSESSMENT
R MCA infarct with cytotoxic edema    Neuro:  mannitol  Na 145-155  neurosurgery eval   bedrest  stroke core measures    Cards:  -160  TTE    Pulm:  intubated  VAP bundle    GI:  NPO    Renal:  hypertonics, Na goal 145-155    Endo:  maintain euglycemia    Heme/onc:  hold chemoppx    ID:  monitor for fevers    family discussion--DNR  palliative care consult  no surgical intervention    at risk for deterioration/death due to malignant MCA, cerebral edema, brain compression, respiratory failure  critical care time 60min

## 2019-06-03 NOTE — CONSULT NOTE ADULT - PROBLEM SELECTOR RECOMMENDATION 3
- CTH reviewed   - cont care per neurology
In setting of large core infarct with hemorrhagic transformation  Currently on Propofol, NSCU to discontinue  Recommend Morphine 1 mg q1hr prn for dyspnea  MOrphine 1 mg q 1 hour ivp prn mild mod severe pain  ATivan 1 mg q 2 hours ivp  prn agitation  Robinul 0.4 mg ivp q 4 hours prn secretions

## 2019-06-03 NOTE — CONSULT NOTE ADULT - PROBLEM SELECTOR RECOMMENDATION 2
- 2/2 CVA   - NGT feeds as tolerated   - strict aspiration precautions to avoid aspiration pna   - given increasing wbc, febrile on 6/1 and productive cough, consider CT Chest to assess for aspiration pna
Intubated, management per NSCU  For likely elective extubation

## 2019-06-03 NOTE — PROGRESS NOTE ADULT - ASSESSMENT
74 year old right handed Occitan male with a history of Afib on Xarelto, HTN, HLD who presents to the ED for eval of left sided weakness found to have CVA and being cared for by neurology. GI consulted for increase in liver enzymes

## 2019-06-03 NOTE — CONSULT NOTE ADULT - PROBLEM SELECTOR RECOMMENDATION 9
- low suspicion for cbd stone/cholecystitis   - trend LFTs and bilirubin   - Check US Abd; if shows dilatation of ducts, check MRCP if no contraindications; if shows acute liz would recommend surgery eval
Large core infarct with hemorrhagic transformation, mass effect and leftward shift.

## 2019-06-03 NOTE — PROGRESS NOTE ADULT - SUBJECTIVE AND OBJECTIVE BOX
Patient noted to be resting comfortably, intubated. Family at bedside. Exam deferred. On morphine gtt. Per notes, patient to be terminally extubated tomorrow (6/4) AM. Awaiting palliative care unit bed.

## 2019-06-03 NOTE — PROGRESS NOTE ADULT - SUBJECTIVE AND OBJECTIVE BOX
INTERVAL HPI/OVERNIGHT EVENTS:    events noted  transferred to NSCU for ams  intubated/sedated    MEDICATIONS  (STANDING):  morphine  Infusion 1 mG/Hr (1 mL/Hr) IV Continuous <Continuous>    MEDICATIONS  (PRN):      Allergies    No Known Allergies    Intolerances        Review of Systems: unable to obtain       Vital Signs Last 24 Hrs  T(C): 37.1 (03 Jun 2019 11:00), Max: 37.8 (03 Jun 2019 08:14)  T(F): 98.8 (03 Jun 2019 11:00), Max: 100.1 (03 Jun 2019 08:14)  HR: 96 (03 Jun 2019 13:00) (94 - 152)  BP: 114/82 (03 Jun 2019 13:00) (97/67 - 244/172)  BP(mean): 93 (03 Jun 2019 13:00) (77 - 194)  RR: 15 (03 Jun 2019 13:00) (15 - 34)  SpO2: 100% (03 Jun 2019 13:00) (94% - 100%)    PHYSICAL EXAM:    Constitutional: intubated/sedated  HEENT: EOMI, throat clear  Neck: No LAD, supple  Respiratory: intubated   Cardiovascular: S1 and S2, RRR, no M  Gastrointestinal: BS+, soft, NT/ND, neg HSM,  Extremities: No peripheral edema, neg clubing, cyanosis  Vascular: 2+ peripheral pulses  Neurological: sedated  Skin: No rashes      LABS:                        14.7   13.5  )-----------( 189      ( 03 Jun 2019 06:15 )             43.6     06-03    142  |  111<H>  |  17  ----------------------------<  166<H>  3.8   |  19<L>  |  0.78    Ca    7.3<L>      03 Jun 2019 10:29  Phos  2.2     06-03  Mg     1.9     06-03    TPro  6.4  /  Alb  3.1<L>  /  TBili  1.4<H>  /  DBili  x   /  AST  40  /  ALT  22  /  AlkPhos  44  06-03    PT/INR - ( 03 Jun 2019 10:29 )   PT: 12.6 sec;   INR: 1.10 ratio         PTT - ( 03 Jun 2019 10:29 )  PTT:28.9 sec      RADIOLOGY & ADDITIONAL TESTS:

## 2019-06-03 NOTE — DIETITIAN INITIAL EVALUATION ADULT. - NS AS NUTRI INTERV ENTERAL NUTRITION
recommend Glucerna 1.2 70cc/hr x 24 hrs to provide 2016 kcals, 100gm protein, 1352cc free water,  meets 25 kcals/kg, 1.2 gm protein/kg dosing wt 79.2 kg

## 2019-06-03 NOTE — PROGRESS NOTE ADULT - ASSESSMENT
74-year-old right-handed [place]ian gentleman first evaluated at [facility] On 5/31/19 he developed acute left hemiparesis, due to a large right MCA infarct with hemorrhagic conversion, most likely due to cardioembolism related to atrial fibrillation, with right carotid dissection or atherosclerosis being less likely.    NEURO: Neurologically overall worsened, repeat CTH demonstrates worsening cerebral edema with mass effect and brain compression- for possible hemicraniectomy, although at his age, the risks and benefits of hemicraniectomy may favor survival but probably not good functional outcome- goals of care, team in discussion with family along with ICU consult and palliative  for transfer and monitoring ,   gradual normotension < 160mmHg given hemorrhagic transformation, LDL 78-titrate statin to LDL goal less than 70, MR imaging pending. Physical therapy/OT recommend acute rehab.     ANTITHROMBOTIC THERAPY: ASA and AC on hold in setting of hemorrhagic transformation, timeline pending clinical course and repeat imaging.     PULMONARY: possible intubation for respiratory failure pending goals of care     CARDIOVASCULAR: check TTE to evaluate for valvular heart disease, cardiac monitoring shows rapid afib at times.  Ensure rate control                   SBP goal: <160 in setting of hemorrhagic conversion     GASTROINTESTINAL:  dysphagia screen failed by S&S- NGT placed for feedings. Concern for cholecystitis as elevated total bilirubin. Continue to trend LFTs and RUQ US ordered.      Diet: NPO with TF via NGT    RENAL: BUN/Cr without acute change, good urine output ; continue on 2% NS for induced hypernatremia       Na Goal: 145-155     Hall: No    HEMATOLOGY: H/H without acute change, Platelets 189     DVT ppx: LMWH on hold due to hemorrhagic conversion- Venodynes for now.    ID: febrile,  leukocytosis, ID following. Differential include cholecystitis, aspiration PNA, central fever from infarct.  Recommended to start Unasyn 3 gm iv q6. Blood cultures pending. CT C/A/P  to r/o infection.     OTHER: Palliative, Nsx, and ICU team at bedside to further discuss plans and goals of care.     DISPOSITION: ICU.     CORE MEASURES:        Admission NIHSS: 17     TPA: NO      LDL/HDL: 78/54     Depression Screen: NA-unable to participate      Statin Therapy: YES     Dysphagia Screen: FAIL     Smoking NO      Afib YES     Stroke Education YES    Obtain screening lower extremity venous ultrasound in patients who meet 1 or more of the following criteria as patient is high risk for DVT/PE on admission:   [] History of DVT/PE  []Hypercoagulable states (Factor V Leiden, Cancer, OCP, etc. )  []Prolonged immobility (hemiplegia/hemiparesis/post operative or any other extended immobilization)  [] Transferred from outside facility (Rehab or Long term care)  [] Age </= to 50 74-year-old right-handed Welsh gentleman first evaluated at [facility] On 5/31/19 he developed acute left hemiparesis, due to a large right MCA infarct with hemorrhagic conversion, most likely due to cardioembolism related to atrial fibrillation, with right carotid dissection or atherosclerosis being less likely.    NEURO: Neurologically overall worsened this AM STAT repeat CTH demonstrates worsening cerebral edema with mass effect and brain compression- for possible hemicraniectomy, although at his age, the risks and benefits of hemicraniectomy may favor survival but probably not good functional outcome- goals of care, team in discussion with family along with ICU consult and palliative  for transfer and monitoring. Patient intubated. Family meeting held. I discussed with family risks and benefits of decompression at this time they wish to pursue palliative care. NSCU, neurosurgery palliative teams present

## 2019-06-03 NOTE — CONSULT NOTE ADULT - PROBLEM SELECTOR RECOMMENDATION 4
Spent >30 minutes with patients wife Raffi Johnson, Telugu speaking and son Rito.  Wife declines  and prefers to have son speak in their native tongue.   Family share patients known wishes not to pursue life saving interventions if he were to remain in a dependent state without quality of life.  Wife appropriately tearful, son very supportive of mother.   We completed MOLST form to solidify our conversation about full comfort approach to include and not limited to:  no iv fluids, no antibiotics, no diagnostics, no aggressive medical interventions no blood draws no artifical nutrition.   Elective extubation to take place when extended family arrives likely today/tomorrow.   Pt for transfer to PCU when bed available.

## 2019-06-03 NOTE — DIETITIAN INITIAL EVALUATION ADULT. - OTHER INFO
Pt seen for: Stroke Unit length of stay  Adm dx: MCA infarct    GI issues: no N/V   Last BM: 5/30 per pt profile     Food allergies/Intolerances: NKFA   Vit/supplement PTA:  none

## 2019-06-03 NOTE — PROGRESS NOTE ADULT - SUBJECTIVE AND OBJECTIVE BOX
THE PATIENT WAS SEEN AND EXAMINED BY ME WITH THE HOUSESTAFF AND STROKE TEAM DURING MORNING ROUNDS.   HPI:  Patient is a 74 year old right handed Turkish male with a history of Afib on Xarelto, HTN, HLD who presented to the ED for eval of left sided weakness. Patient was later transfer to NS for further management and possible intervention. Patient was last seen to be normal at 1 am on 5/31 when he finished watching TV with his son and went up stairs to bed. His wife woke up at 6:40 am day of admission and tried to wake him and found him to be weak on the left side and with severely slurred speech so he was brought to the ED. His wife did not notice him get out of bed or going to the bathroom between 1 am - 6:40 am. He has no history of stroke or TIA. Patient is complaint with Xarelto. NIHSS was 17 at [facility] and was 20 at [facility] and mrs was 0. tpa was not given as patient was out of window and was on Xarelto. Patient at [facility] found to have right proximal M1 occlusion. Patient had CT head and CT perfusion done at [facility], which showed early signs of RMCA infarction and core infarct of 152 cc. Patient was not candidate for intervention as patient has large core infarct.     SUBJECTIVE: Reported by RN this morning lethargy and pupillary changes.   Translation in shared language by RN Soon    propofol Infusion 30 MICROgram(s)/kG/Min IV Continuous <Continuous>      PHYSICAL EXAM:   Vital Signs Last 24 Hrs  T(C): 37.1 (03 Jun 2019 11:00), Max: 37.8 (03 Jun 2019 08:14)  T(F): 98.8 (03 Jun 2019 11:00), Max: 100.1 (03 Jun 2019 08:14)  HR: 96 (03 Jun 2019 13:00) (94 - 152)  BP: 114/82 (03 Jun 2019 13:00) (97/67 - 244/172)  BP(mean): 93 (03 Jun 2019 13:00) (77 - 194)  RR: 15 (03 Jun 2019 13:00) (15 - 34)  SpO2: 100% (03 Jun 2019 13:00) (94% - 100%)    General: respirations labored   HEENT: no blink ot threat b/l, right pupil dilated 4mm left pupil 2mm   Abdomen: Soft, nontender, nondistended   Extremities: No edema    NEUROLOGICAL EXAM:  Mental status: eyes closed, not following commands, no verbal output   Cranial Nerves: left facial droop, right pupil 4mm dilated left pupil 2mm reactive   Motor exam: does not move extremities within bed   Sensation: no response to tactile stimuli   Coordination/ Gait: gait not assessed     LABS:                        14.7   13.5  )-----------( 189      ( 03 Jun 2019 06:15 )             43.6    06-03    142  |  111<H>  |  17  ----------------------------<  166<H>  3.8   |  19<L>  |  0.78    Ca    7.3<L>      03 Jun 2019 10:29  Phos  2.2     06-03  Mg     1.9     06-03    TPro  6.4  /  Alb  3.1<L>  /  TBili  1.4<H>  /  DBili  x   /  AST  40  /  ALT  22  /  AlkPhos  44  06-03  PT/INR - ( 03 Jun 2019 10:29 )   PT: 12.6 sec;   INR: 1.10 ratio         PTT - ( 03 Jun 2019 10:29 )  PTT:28.9 sec  Hemoglobin A1C, Whole Blood: 7.1 % (06-01 @ 11:02)      IMAGING: Reviewed by me.   CT Head 6/3/19:  1.2 x 0.7 cm right basal ganglia area of hemorrhagic transformation along   large right MCA territory infarction.    Increased mass effect associated with right MCA territory infarction with   1.2 cm leftward midline shift.    Slight increased left lateral ventricular size which may signify   developing hydrocephalus.    MRI Head No Cont (06.01.19)  Evolving right MCA infarct with new susceptibility on SWI and GRE in the   right lentiform nuclei, posterior limb internal capsule and thalamus   concerning for hemorrhagic transformation. Continued edema and mass   effect with effacement of the right lateral ventricle and 6.5 mm leftward   shift.    CT Head No Cont (05.31.19 19:41)  Interval demarcation of an extensive acute right MCA territory infarct.   No CT evidence for hemorrhagic transformation.   New mass effect upon the right lateral ventricle, no midline shift or   effacement of the basal cisterns.    CT Perfusion (05.31.19 09:30)  There is a large core infarct measuring 152 ml, delayed mean transit   time 240 ml  CBF<30% volume: 152 ml  T max>6.0s volume: 240 ml  Mismatch volume: 88 ml  Mismatch ratio: 1.6  CBV<34% volume: 139 ml    CT Head No Cont (05.31.19 09:30)  Large acute right middle cerebral artery infarct. No   hemorrhage.

## 2019-06-03 NOTE — CONSULT NOTE ADULT - ASSESSMENT
74M with large R sided infarct now with worsening edema and herniation  - Mannitol  - Given age and poor exam, high likelihood of poor functional outcome regardless of intervention  - Discussed need to make emergent decision regarding surgery  - Spoke with Wife and Son at length using pacific  service, wife deferred to son who does not think his father would want to live that way  - Wishes to maximize medical therapy at this time  - Palliative following for GOC

## 2019-06-03 NOTE — PROGRESS NOTE ADULT - SUBJECTIVE AND OBJECTIVE BOX
Called to patient's bedside for intubation.  Therapy initiated by primary medical team.    Patient Assessment:     Baseline Vital Signs:  BP:   HR:   RR:   SpO2:     Medications Administered:    Intubation:      [ ] Direct Laryngoscopy    [ ] Video-Assisted Laryngoscopy   [ ] Fiberoptic Intubation    Blade:   Cormack-Lehane View: [ ] 1     [ ] 2A     [ ] 2B     [ ] 3     [ ]  4  ETT Size:  Marking at Teeth:    Post-Intubation Vital Signs:  BP:  HR:  RR:   SpO2:     Positive end-tidal carbon dioxide via EasyCap, positive bilateral breath sounds.  CXR to be reviewed by primary team.  Atraumatic intubation with no complications. Called to patient's bedside for intubation.  Therapy initiated by primary medical team.    Patient Assessment:   called for intubation for stroke and respiratory failure. At bedside pt is in moderate distress using accessory muscles of respiration.     Baseline Vital Signs:  BP: 170/117  HR: 110  RR: 40  SpO2: 95    Medications Administered:  Propofol 50mg  Succinylcholine 100mg    Intubation:      [X] Direct Laryngoscopy    [ ] Video-Assisted Laryngoscopy   [ ] Fiberoptic Intubation    Blade: MAC 3  Cormack-Lehane View: [ ] 1     [ ] 2A     [ ] 2B     [X] 3     [ ]  4  ETT Size:  Marking at Teeth: 21cm    Post-Intubation Vital Signs:  BP: 168/90  HR: 130  RR: vent  SpO2: 95%    Positive end-tidal carbon dioxide via EasyCap, positive bilateral breath sounds.  CXR to be reviewed by primary team.  Atraumatic intubation with no complications.

## 2019-06-03 NOTE — SWALLOW BEDSIDE ASSESSMENT ADULT - SWALLOW EVAL: DIAGNOSIS
SLP attempted to see Pt for swallowing evaluation however Pt is currently intubated.  Therefore, this service to sign off.  Request MD to reconsult this department as Pt's status improves. SLP attempted to see Pt for swallowing reevaluation however Pt is currently intubated.  Therefore, this service to sign off.  Request MD to reconsult this department as Pt's status improves.

## 2019-06-03 NOTE — CHART NOTE - NSCHARTNOTEFT_GEN_A_CORE
Met with patients two sons and wife again this afternoon at family request .  They have decided to electively extubate tomorrow 6/4 at 10 am after the family  comes to bedside to preform Restoration ceremony.   Patient slated for PCU when bed available.

## 2019-06-03 NOTE — DIETITIAN INITIAL EVALUATION ADULT. - PERTINENT MEDS FT
MEDICATIONS  (STANDING):  ampicillin/sulbactam  IVPB      ampicillin/sulbactam  IVPB 3 Gram(s) IV Intermittent every 6 hours  atorvastatin 20 milliGRAM(s) Oral at bedtime  dextrose 50% Injectable 12.5 Gram(s) IV Push once  insulin lispro (HumaLOG) corrective regimen sliding scale   SubCutaneous every 6 hours  losartan 25 milliGRAM(s) Oral daily  sodium chloride 2% . 1000 milliLiter(s) (75 mL/Hr) IV Continuous <Continuous>

## 2019-06-03 NOTE — PROGRESS NOTE ADULT - SUBJECTIVE AND OBJECTIVE BOX
75yo man afib on xarelto adm on 5/31 for R MCA 75yo man afib on xarelto adm on 5/31 for R MCA acute CVA, no tPA as patient compliant with xarelto, not an endovascular candidate due to large core infarct, became lethargic this am, CTH showing worsening cytotoxic edema and midline shift, in acute respiratory distress. Family contacted, Patient emergently intubated.     Transferred to NSCU.     Vitals/labs/meds/imaging reviewed  b/l pupils 3mm not reactive, +cough  RUE LC, LUE flex, RLE WD>LLE WD  irregularly irregular  clear lungs  soft abd  wwp ext

## 2019-06-04 VITALS — SYSTOLIC BLOOD PRESSURE: 70 MMHG | HEART RATE: 132 BPM | TEMPERATURE: 99 F | DIASTOLIC BLOOD PRESSURE: 51 MMHG

## 2019-06-04 DIAGNOSIS — Z71.89 OTHER SPECIFIED COUNSELING: ICD-10-CM

## 2019-06-04 PROBLEM — I10 ESSENTIAL (PRIMARY) HYPERTENSION: Chronic | Status: ACTIVE | Noted: 2019-05-31

## 2019-06-04 PROBLEM — I48.91 UNSPECIFIED ATRIAL FIBRILLATION: Chronic | Status: ACTIVE | Noted: 2019-05-31

## 2019-06-04 PROCEDURE — 99233 SBSQ HOSP IP/OBS HIGH 50: CPT

## 2019-06-04 RX ORDER — ACETAMINOPHEN 500 MG
1000 TABLET ORAL ONCE
Refills: 0 | Status: DISCONTINUED | OUTPATIENT
Start: 2019-06-04 | End: 2019-06-04

## 2019-06-04 RX ORDER — ACETAMINOPHEN 500 MG
1000 TABLET ORAL ONCE
Refills: 0 | Status: COMPLETED | OUTPATIENT
Start: 2019-06-04 | End: 2019-06-04

## 2019-06-04 RX ORDER — ROBINUL 0.2 MG/ML
0.4 INJECTION INTRAMUSCULAR; INTRAVENOUS EVERY 6 HOURS
Refills: 0 | Status: DISCONTINUED | OUTPATIENT
Start: 2019-06-04 | End: 2019-06-05

## 2019-06-04 RX ORDER — HYDROMORPHONE HYDROCHLORIDE 2 MG/ML
2 INJECTION INTRAMUSCULAR; INTRAVENOUS; SUBCUTANEOUS
Refills: 0 | Status: DISCONTINUED | OUTPATIENT
Start: 2019-06-04 | End: 2019-06-05

## 2019-06-04 RX ORDER — MORPHINE SULFATE 50 MG/1
2 CAPSULE, EXTENDED RELEASE ORAL
Refills: 0 | Status: DISCONTINUED | OUTPATIENT
Start: 2019-06-04 | End: 2019-06-04

## 2019-06-04 RX ORDER — HYDROMORPHONE HYDROCHLORIDE 2 MG/ML
1 INJECTION INTRAMUSCULAR; INTRAVENOUS; SUBCUTANEOUS
Qty: 100 | Refills: 0 | Status: DISCONTINUED | OUTPATIENT
Start: 2019-06-04 | End: 2019-06-05

## 2019-06-04 RX ADMIN — MORPHINE SULFATE 2 MILLIGRAM(S): 50 CAPSULE, EXTENDED RELEASE ORAL at 11:15

## 2019-06-04 RX ADMIN — ROBINUL 0.4 MILLIGRAM(S): 0.2 INJECTION INTRAMUSCULAR; INTRAVENOUS at 11:25

## 2019-06-04 RX ADMIN — HYDROMORPHONE HYDROCHLORIDE 1 MG/HR: 2 INJECTION INTRAMUSCULAR; INTRAVENOUS; SUBCUTANEOUS at 17:19

## 2019-06-04 RX ADMIN — Medication 1 MILLIGRAM(S): at 11:20

## 2019-06-04 RX ADMIN — HYDROMORPHONE HYDROCHLORIDE 2 MILLIGRAM(S): 2 INJECTION INTRAMUSCULAR; INTRAVENOUS; SUBCUTANEOUS at 12:10

## 2019-06-04 RX ADMIN — HYDROMORPHONE HYDROCHLORIDE 2 MILLIGRAM(S): 2 INJECTION INTRAMUSCULAR; INTRAVENOUS; SUBCUTANEOUS at 23:52

## 2019-06-04 RX ADMIN — HYDROMORPHONE HYDROCHLORIDE 2 MILLIGRAM(S): 2 INJECTION INTRAMUSCULAR; INTRAVENOUS; SUBCUTANEOUS at 12:25

## 2019-06-04 RX ADMIN — Medication 400 MILLIGRAM(S): at 11:50

## 2019-06-04 RX ADMIN — Medication 400 MILLIGRAM(S): at 19:39

## 2019-06-04 RX ADMIN — MORPHINE SULFATE 1 MG/HR: 50 CAPSULE, EXTENDED RELEASE ORAL at 03:04

## 2019-06-04 RX ADMIN — HYDROMORPHONE HYDROCHLORIDE 1 MG/HR: 2 INJECTION INTRAMUSCULAR; INTRAVENOUS; SUBCUTANEOUS at 20:00

## 2019-06-04 RX ADMIN — Medication 1 MILLIGRAM(S): at 11:15

## 2019-06-04 NOTE — PROVIDER CONTACT NOTE (EICU) - ACTION/TREATMENT ORDERED:
IV tylenol given previously as per PCU NP Paola Franks for temp. No further medical action as per team. Ice packs placed for comfort, continue to monitor vital signs q12

## 2019-06-04 NOTE — PROGRESS NOTE ADULT - REASON FOR ADMISSION
Right MCA infarct

## 2019-06-04 NOTE — PROVIDER CONTACT NOTE (EICU) - NAME OF MD/NP/PA/DO NOTIFIED:
Gurpreet SMITH Reports having abdominal pain staring this morning, reports unrelated to a meal, worsens with drinking or eating, reports having hx of gastroporesis.

## 2019-06-04 NOTE — PROGRESS NOTE ADULT - ASSESSMENT
malignant R MCA infarct     will extubate when family is ready ( to come around 10am)  palliative care following  comfort measures only

## 2019-06-04 NOTE — PROGRESS NOTE ADULT - PROBLEM SELECTOR PLAN 1
- Pt intubated, transfer to NSCU  - DNR now  - comfort measures in place  - will sign off, please recall if needed [phone]
-No further medical interventions,  large MCA infarct with mass effect and leftward shift.  -Comfort measures only

## 2019-06-04 NOTE — PROGRESS NOTE ADULT - PROBLEM SELECTOR PLAN 4
Advanced care planning was discussed with patient and family.  Advanced care planning forms were reviewed and discussed.  Risks, benefits and alternatives of gastroenterologic procedures were discussed in detail and all questions were answered.    30 minutes spent.
Patients wishes were shared via his family that a life of dependency was not consistent with  quality of life and therefore a full comfort approach was initiated.  Pt is post elective extubation.   Appears comfortable in the active phases of dying.  Family and friends at bedside.   Patient slated for PCU when bed available.

## 2019-06-04 NOTE — DISCHARGE NOTE NURSING/CASE MANAGEMENT/SOCIAL WORK - NSDCPEPTSTRK_GEN_ALL_CORE
Stroke warning signs and symptoms/Signs and symptoms of stroke/Prescribed medications/Risk factors for stroke/Stroke education booklet/Need for follow up after discharge/Call 911 for stroke/Stroke support groups for patients, families, and friends

## 2019-06-04 NOTE — PROGRESS NOTE ADULT - ASSESSMENT
73 Y/O hx of afib on xaralto, admitted to [facility] with slurred speech and ams,  CTH [facility] found to have right proximal M1 occlusion. Patient had CT head and CT perfusion done at [facility], which showed early signs of RMCA infarction and core infarct. Repeat CTH  with evolving right basal ganglia area of hemorrhagic transformation with large right MCA infarction with noted worsening mass effect and leftward shift, called for goals of care

## 2019-06-04 NOTE — PROVIDER CONTACT NOTE (EICU) - SITUATION
Patient originally in 4 madrigal for R MCA infarct, hemorrhagic conversion, intubated and admitted to NSCU

## 2019-06-04 NOTE — PROGRESS NOTE ADULT - PROBLEM SELECTOR PLAN 2
- 2/2 CVA   - NPO  - comfort measures
-S/P  elective extubation.  Required total 6 mg Morphine ivp for respiratory distress.  Changed Morphine drip to 1mg /hr with PRN available  -Also given 1 mg Ativan IVP pre extubation, requiring additional 1mg post extubation.   Ativan PRN remain avialable

## 2019-06-04 NOTE — DISCHARGE NOTE NURSING/CASE MANAGEMENT/SOCIAL WORK - NSDCDPATPORTLINK_GEN_ALL_CORE
You can access the [facility] Patient Portal, offered by [facility], by registering with the following website: [email or web address]

## 2019-06-04 NOTE — PROGRESS NOTE ADULT - SUBJECTIVE AND OBJECTIVE BOX
SUBJECTIVE AND OBJECTIVE:  INTERVAL HPI/OVERNIGHT EVENTS:  No overnight events.  Patient remains unresponsive, off propofol    DNR on chart: Yes    Allergies    No Known Allergies    Intolerances    MEDICATIONS  (STANDING):  morphine  Infusion 1 mG/Hr (1 mL/Hr) IV Continuous <Continuous>    MEDICATIONS  (PRN):  glycopyrrolate Injectable 0.4 milliGRAM(s) IV Push every 6 hours PRN secretions  LORazepam   Injectable 1 milliGRAM(s) IV Push every 4 hours PRN Agitation  morphine  - Injectable 2 milliGRAM(s) IV Push every 2 hours PRN mild mod severe pain  morphine  - Injectable 2 milliGRAM(s) IV Push every 2 hours PRN dyspnea      ITEMS UNCHECKED ARE NOT PRESENT    PRESENT SYMPTOMS: [ ]Unable to obtain due to poor mentation   Source if other than patient:  [ ]Family   [ ]Team     Pain (Impact on QOL):    Location:  Minimal acceptable level (0-10 scale):            Aggravating factors:  Quality:  Radiation:  Severity (0-10 scale):    Timing:    Dyspnea:                           [ ]Mild [ ]Moderate [ ]Severe  Anxiety:                             [ ]Mild [ ]Moderate [ ]Severe  Fatigue:                             [ ]Mild [ ]Moderate [ ]Severe  Nausea:                             [ ]Mild [ ]Moderate [ ]Severe  Loss of appetite:              [ ]Mild [ ]Moderate [ ]Severe  Constipation:                    [ ]Mild [ ]Moderate [ ]Severe    PAIN AD Score:	  [email or web address].[email or web address]/painad.pdf (Ctrl + left click to view)    Other Symptoms:  [ ]All other review of systems negative     Karnofsky Performance Score/Palliative Performance Status Version 2:         %    http://palliative.info/resource_material/PPSv2.pdf  PHYSICAL EXAM:  Vital Signs Last 24 Hrs  T(C): 38.5 (04 Jun 2019 07:00), Max: 38.5 (04 Jun 2019 07:00)  T(F): 101.3 (04 Jun 2019 07:00), Max: 101.3 (04 Jun 2019 07:00)  HR: 112 (04 Jun 2019 08:19) (96 - 121)  BP: 157/98 (04 Jun 2019 07:00) (97/67 - 157/98)  BP(mean): 114 (04 Jun 2019 07:00) (77 - 115)  RR: 12 (04 Jun 2019 07:00) (12 - 22)  SpO2: 98% (04 Jun 2019 08:19) (97% - 100%) I&O's Summary    03 Jun 2019 07:01  -  04 Jun 2019 07:00  --------------------------------------------------------  IN: 1281 mL / OUT: 1735 mL / NET: -454 mL     GENERAL:  [ ]Alert  [ ]Oriented x   [ ]Lethargic  [ ]Cachexia  [ ]Unarousable  [ ]Verbal  [ ]Non-Verbal    Behavioral:   [ ] Anxiety  [ ] Delirium [ ] Agitation [ ] Other    HEENT:  [ ]Normal   [ ]Dry mouth   [ ]ET Tube/Trach  [ ]Oral lesions    PULMONARY:   [ ]Clear [ ]Tachypnea  [ ]Audible excessive secretions   [ ]Rhonchi        [ ]Right [ ]Left [ ]Bilateral  [ ]Crackles        [ ]Right [ ]Left [ ]Bilateral  [ ]Wheezing     [ ]Right [ ]Left [ ]Bilateral    CARDIOVASCULAR:    [ ]Regular [ ]Irregular [ ]Tachy  [ ]Skinny [ ]Murmur [ ]Other    GASTROINTESTINAL:  [ ]Soft  [ ]Distended   [ ]+BS  [ ]Non tender [ ]Tender  [ ]PEG [ ]OGT/ NGT   Last BM:    GENITOURINARY:  [ ]Normal [ ] Incontinent   [ ]Oliguria/Anuria   [ ]Hall    MUSCULOSKELETAL:   [ ]Normal   [ ]Weakness  [ ]Bed/Wheelchair bound [ ]Edema    NEUROLOGIC:   [ ]No focal deficits  [ ] Cognitive impairment  [ ] Dysphagia [ ]Dysarthria [ ] Paresis [ ]Other     SKIN:   [ ]Normal   [ ]Pressure ulcer(s)  [ ]Rash    CRITICAL CARE:  [ ] Shock Present  [ ]Septic [ ]Cardiogenic [ ]Neurologic [ ]Hypovolemic  [ ]  Vasopressors [ ]  Inotropes   [ ] Respiratory failure present  [ ] Acute  [ ] Chronic [ ] Hypoxic  [ ] Hypercarbic [ ] Other  [ ] Other organ failure     LABS:                        14.7   13.5  )-----------( 189      ( 03 Jun 2019 06:15 )             43.6   06-03    142  |  111<H>  |  17  ----------------------------<  166<H>  3.8   |  19<L>  |  0.78    Ca    7.3<L>      03 Jun 2019 10:29  Phos  2.2     06-03  Mg     1.9     06-03    TPro  6.4  /  Alb  3.1<L>  /  TBili  1.4<H>  /  DBili  x   /  AST  40  /  ALT  22  /  AlkPhos  44  06-03  PT/INR - ( 03 Jun 2019 10:29 )   PT: 12.6 sec;   INR: 1.10 ratio         PTT - ( 03 Jun 2019 10:29 )  PTT:28.9 sec      RADIOLOGY & ADDITIONAL STUDIES:    Protein Calorie Malnutrition Present: [ ] yes [ ] no  [ ] PPSV2 < or = 30%  [ ] significant weight loss [ ] poor nutritional intake [ ] anasarca [ ] catabolic state Albumin, Serum: 3.1 g/dL (06-03-19 @ 06:15)  Artificial Nutrition [ ]     REFERRALS:   [ ]Chaplaincy  [ ] Hospice  [ ]Child Life  [ ]Social Work  [ ]Case management [ ]Holistic Therapy   Goals of Care Document: SUBJECTIVE AND OBJECTIVE:  INTERVAL HPI/OVERNIGHT EVENTS:  No overnight events.  Patient remains unresponsive, off propofol    DNR on chart: Yes    Allergies    No Known Allergies    Intolerances    MEDICATIONS  (STANDING):  morphine  Infusion 1 mG/Hr (1 mL/Hr) IV Continuous <Continuous>    MEDICATIONS  (PRN):  glycopyrrolate Injectable 0.4 milliGRAM(s) IV Push every 6 hours PRN secretions  LORazepam   Injectable 1 milliGRAM(s) IV Push every 4 hours PRN Agitation  morphine  - Injectable 2 milliGRAM(s) IV Push every 2 hours PRN mild mod severe pain  morphine  - Injectable 2 milliGRAM(s) IV Push every 2 hours PRN dyspnea      ITEMS UNCHECKED ARE NOT PRESENT    PRESENT SYMPTOMS: [x ]Unable to obtain due to poor mentation   Source if other than patient:  [ ]Family   [ ]Team     Pain (Impact on QOL):    Location:  Minimal acceptable level (0-10 scale):            Aggravating factors:  Quality:  Radiation:  Severity (0-10 scale):    Timing:    Dyspnea:                           [ ]Mild [ ]Moderate [ ]Severe  Anxiety:                             [ ]Mild [ ]Moderate [ ]Severe  Fatigue:                             [ ]Mild [ ]Moderate [ ]Severe  Nausea:                             [ ]Mild [ ]Moderate [ ]Severe  Loss of appetite:              [ ]Mild [ ]Moderate [ ]Severe  Constipation:                    [ ]Mild [ ]Moderate [ ]Severe    PAIN AD Score:	0  [email or web address].[email or web address]/cog/painad.[email or web address] (Ctrl + left click to view)    Other Symptoms:  [ ]All other review of systems negative     Karnofsky Performance Score/Palliative Performance Status Version 2:    10     %    http://palliative.info/resource_material/PPSv2.pdf  PHYSICAL EXAM:  Vital Signs Last 24 Hrs  T(C): 38.5 (04 Jun 2019 07:00), Max: 38.5 (04 Jun 2019 07:00)  T(F): 101.3 (04 Jun 2019 07:00), Max: 101.3 (04 Jun 2019 07:00)  HR: 112 (04 Jun 2019 08:19) (96 - 121)  BP: 157/98 (04 Jun 2019 07:00) (97/67 - 157/98)  BP(mean): 114 (04 Jun 2019 07:00) (77 - 115)  RR: 12 (04 Jun 2019 07:00) (12 - 22)  SpO2: 98% (04 Jun 2019 08:19) (97% - 100%) I&O's Summary    03 Jun 2019 07:01  -  04 Jun 2019 07:00  --------------------------------------------------------  IN: 1281 mL / OUT: 1735 mL / NET: -454 mL     GENERAL:  [ ]Alert  [ ]Oriented x   [ ]Lethargic  [ ]Cachexia  [x ]Unarousable  [ ]Verbal  [ x]Non-Verbal    Behavioral:   [ ] Anxiety  [ ] Delirium [ ] Agitation [ ] Other    HEENT:  [ ]Normal   [ ]Dry mouth   [x ]ET Tube/Trach  [ ]Oral lesions    PULMONARY: vented   [ ]Clear [ ]Tachypnea  [ ]Audible excessive secretions   [ ]Rhonchi        [ ]Right [ ]Left [ ]Bilateral  [ ]Crackles        [ ]Right [ ]Left [ ]Bilateral  [ ]Wheezing     [ ]Right [ ]Left [ ]Bilateral    CARDIOVASCULAR:    [ ]Regular [x ]Irregular [x ]Tachy  [ ]Skinny [ ]Murmur [ ]Other    GASTROINTESTINAL:  [x ]Soft  [ ]Distended   [x ]+BS  [ ]Non tender [ ]Tender  [ ]PEG [ ]OGT/ NGT   Last BM:    GENITOURINARY:  [ ]Normal [ ] Incontinent   [ ]Oliguria/Anuria   [x ]Hall    MUSCULOSKELETAL:   [ ]Normal   [ ]Weakness  [x ]Bed/Wheelchair bound [ ]Edema    NEUROLOGIC:   [ ]No focal deficits  [x ] Cognitive impairment  [ ] Dysphagia [ ]Dysarthria [ ] Paresis [ ]Other     SKIN:   [x ]Normal   [ ]Pressure ulcer(s)  [ ]Rash    CRITICAL CARE:  [ ] Shock Present  [ ]Septic [ ]Cardiogenic [x ]Neurologic [ ]Hypovolemic  [ ]  Vasopressors [ ]  Inotropes   [x ] Respiratory failure present  [x ] Acute  [ ] Chronic [ ] Hypoxic  [ ] Hypercarbic [ ] Other  [ ] Other organ failure     LABS:                        14.7   13.5  )-----------( 189      ( 03 Jun 2019 06:15 )             43.6   06-03    142  |  111<H>  |  17  ----------------------------<  166<H>  3.8   |  19<L>  |  0.78    Ca    7.3<L>      03 Jun 2019 10:29  Phos  2.2     06-03  Mg     1.9     06-03    TPro  6.4  /  Alb  3.1<L>  /  TBili  1.4<H>  /  DBili  x   /  AST  40  /  ALT  22  /  AlkPhos  44  06-03  PT/INR - ( 03 Jun 2019 10:29 )   PT: 12.6 sec;   INR: 1.10 ratio         PTT - ( 03 Jun 2019 10:29 )  PTT:28.9 sec      RADIOLOGY & ADDITIONAL STUDIES:        INTERPRETATION:  Clinical indication: Change in mental status. Stroke.    Technique: Noncontrast CT of the head was performed.    Multiple contiguous axial images were acquired from the skull base to the   vertex without the administration of intravenous contrast. Coronal and   sagittal reformations were made.    COMPARISON: CT head dated 5/31/2018.    FINDINGS:  A large right MCA territory infarction seen as lossof cortical medullary   distinction and sulcal effacement involving the right frontal, temporal,   and parietal lobes is again seen. There is no hyperdensity within the   right basal ganglia measuring 1.2 x 0.7 cm, consistent with small amounts   of hemorrhagic transformation along the right basal ganglia region.    Leftward midline shift now measures up to 1.2 cm.    Hyperdensity along the right supraclinoid internal carotid artery and MCA   is unchanged.    There is otherwise a slightly increased size of the left lateral   ventricle which may represent developing left hydrocephalus.     The calvarium is intact. The visualized intraorbital compartments,   paranasal sinuses and tympanomastoid cavities appear free of acute   disease.      IMPRESSION:  1.2 x 0.7 cm right basal ganglia area of hemorrhagic transformation along   large right MCA territory infarction.    Increased mass effect associated with right MCA territory infarction with   1.2 cm leftward midline shift.    Slight increased left lateral ventricular size which may signify   developing hydrocephalus.          Protein Calorie Malnutrition Present: [ ] yes [ ] no  [ ] PPSV2 < or = 30%  [ ] significant weight loss [ ] poor nutritional intake [ ] anasarca [ ] catabolic state Albumin, Serum: 3.1 g/dL (06-03-19 @ 06:15)  Artificial Nutrition [ ]     REFERRALS:   [ ]Chaplaincy  [ ] Hospice  [ ]Child Life  [ ]Social Work  [ ]Case management [ ]Holistic Therapy   Goals of Care Document: SUBJECTIVE AND OBJECTIVE:  INTERVAL HPI/OVERNIGHT EVENTS:  No overnight events.  Patient remains unresponsive, off propofol    DNR on chart: Yes    Allergies    No Known Allergies    Intolerances    MEDICATIONS  (STANDING):  morphine  Infusion 1 mG/Hr (1 mL/Hr) IV Continuous <Continuous>    MEDICATIONS  (PRN):  glycopyrrolate Injectable 0.4 milliGRAM(s) IV Push every 6 hours PRN secretions  LORazepam   Injectable 1 milliGRAM(s) IV Push every 4 hours PRN Agitation  morphine  - Injectable 2 milliGRAM(s) IV Push every 2 hours PRN mild mod severe pain  morphine  - Injectable 2 milliGRAM(s) IV Push every 2 hours PRN dyspnea      ITEMS UNCHECKED ARE NOT PRESENT    PRESENT SYMPTOMS: [x ]Unable to obtain due to poor mentation   Source if other than patient:  [ ]Family   [ ]Team     Pain (Impact on QOL):    Location:  Minimal acceptable level (0-10 scale):            Aggravating factors:  Quality:  Radiation:  Severity (0-10 scale):    Timing:    Dyspnea:                           [ ]Mild [ ]Moderate [ ]Severe  Anxiety:                             [ ]Mild [ ]Moderate [ ]Severe  Fatigue:                             [ ]Mild [ ]Moderate [ ]Severe  Nausea:                             [ ]Mild [ ]Moderate [ ]Severe  Loss of appetite:              [ ]Mild [ ]Moderate [ ]Severe  Constipation:                    [ ]Mild [ ]Moderate [ ]Severe    PAIN AD Score:	0  [email or web address].[email or web address]/cog/painad.[email or web address] (Ctrl + left click to view)    Other Symptoms:  [ ]All other review of systems negative     Karnofsky Performance Score/Palliative Performance Status Version 2:    10     %    http://palliative.info/resource_material/[email or web address].pdf  PHYSICAL EXAM:  Vital Signs Last 24 Hrs  T(C): 38.5 (04 Jun 2019 07:00), Max: 38.5 (04 Jun 2019 07:00)  T(F): 101.3 (04 Jun 2019 07:00), Max: 101.3 (04 Jun 2019 07:00)  HR: 112 (04 Jun 2019 08:19) (96 - 121)  BP: 157/98 (04 Jun 2019 07:00) (97/67 - 157/98)  BP(mean): 114 (04 Jun 2019 07:00) (77 - 115)  RR: 12 (04 Jun 2019 07:00) (12 - 22)  SpO2: 98% (04 Jun 2019 08:19) (97% - 100%) I&O's Summary    03 Jun 2019 07:01  -  04 Jun 2019 07:00  --------------------------------------------------------  IN: 1281 mL / OUT: 1735 mL / NET: -454 mL     GENERAL:  [ ]Alert  [ ]Oriented x   [ ]Lethargic  [ ]Cachexia  [x ]Unarousable  [ ]Verbal  [ x]Non-Verbal    Behavioral:   [ ] Anxiety  [ ] Delirium [ ] Agitation [ ] Other    HEENT:  [ ]Normal   [ ]Dry mouth   [x ]ET Tube/Trach  [ ]Oral lesions    PULMONARY: vented   [ ]Clear [ ]Tachypnea  [ ]Audible excessive secretions   [ ]Rhonchi        [ ]Right [ ]Left [ ]Bilateral  [ ]Crackles        [ ]Right [ ]Left [ ]Bilateral  [ ]Wheezing     [ ]Right [ ]Left [ ]Bilateral    CARDIOVASCULAR:    [ ]Regular [x ]Irregular [x ]Tachy  [ ]Skinny [ ]Murmur [ ]Other    GASTROINTESTINAL:  [x ]Soft  [ ]Distended   [x ]+BS  [ ]Non tender [ ]Tender  [ ]PEG [ ]OGT/ NGT   Last BM:    GENITOURINARY:  [ ]Normal [ ] Incontinent   [ ]Oliguria/Anuria   [x ]Hall    MUSCULOSKELETAL:   [ ]Normal   [ ]Weakness  [x ]Bed/Wheelchair bound [ ]Edema    NEUROLOGIC:   [ ]No focal deficits  [x ] Cognitive impairment  [ ] Dysphagia [ ]Dysarthria [ ] Paresis [ ]Other     SKIN:   [x ]Normal   [ ]Pressure ulcer(s)  [ ]Rash    CRITICAL CARE:  [ ] Shock Present  [ ]Septic [ ]Cardiogenic [x ]Neurologic [ ]Hypovolemic  [ ]  Vasopressors [ ]  Inotropes   [x ] Respiratory failure present  [x ] Acute  [ ] Chronic [ ] Hypoxic  [ ] Hypercarbic [ ] Other  [ ] Other organ failure     LABS:                        14.7   13.5  )-----------( 189      ( 03 Jun 2019 06:15 )             43.6   06-03    142  |  111<H>  |  17  ----------------------------<  166<H>  3.8   |  19<L>  |  0.78    Ca    7.3<L>      03 Jun 2019 10:29  Phos  2.2     06-03  Mg     1.9     06-03    TPro  6.4  /  Alb  3.1<L>  /  TBili  1.4<H>  /  DBili  x   /  AST  40  /  ALT  22  /  AlkPhos  44  06-03  PT/INR - ( 03 Jun 2019 10:29 )   PT: 12.6 sec;   INR: 1.10 ratio         PTT - ( 03 Jun 2019 10:29 )  PTT:28.9 sec      RADIOLOGY & ADDITIONAL STUDIES:        INTERPRETATION:  Clinical indication: Change in mental status. Stroke.    Technique: Noncontrast CT of the head was performed.    Multiple contiguous axial images were acquired from the skull base to the   vertex without the administration of intravenous contrast. Coronal and   sagittal reformations were made.    COMPARISON: CT head dated 5/31/2018.    FINDINGS:  A large right MCA territory infarction seen as lossof cortical medullary   distinction and sulcal effacement involving the right frontal, temporal,   and parietal lobes is again seen. There is no hyperdensity within the   right basal ganglia measuring 1.2 x 0.7 cm, consistent with small amounts   of hemorrhagic transformation along the right basal ganglia region.    Leftward midline shift now measures up to 1.2 cm.    Hyperdensity along the right supraclinoid internal carotid artery and MCA   is unchanged.    There is otherwise a slightly increased size of the left lateral   ventricle which may represent developing left hydrocephalus.     The calvarium is intact. The visualized intraorbital compartments,   paranasal sinuses and tympanomastoid cavities appear free of acute   disease.      IMPRESSION:  1.2 x 0.7 cm right basal ganglia area of hemorrhagic transformation along   large right MCA territory infarction.    Increased mass effect associated with right MCA territory infarction with   1.2 cm leftward midline shift.    Slight increased left lateral ventricular size which may signify   developing hydrocephalus.          Protein Calorie Malnutrition Present: [ ] yes [ ] no  [ ] PPSV2 < or = 30%  [ ] significant weight loss [ x] poor nutritional intake [ ] anasarca [ ] catabolic state Albumin, Serum: 3.1 g/dL (06-03-19 @ 06:15)  Artificial Nutrition [ ]     REFERRALS:   [ ]Chaplaincy  [ ] Hospice  [ ]Child Life  [ x]Social Work  [ ]Case management [ ]Holistic Therapy   Goals of Care Document:

## 2019-06-04 NOTE — AIRWAY REMOVAL NOTE  ADULT & PEDS - ARTIFICAL AIRWAY REMOVAL COMMENTS
Written order for extubation verified. The patient was identified by full name and birth date compared to the identification band. Present during the procedure was Anh MEJIA.

## 2019-06-05 PROCEDURE — 80076 HEPATIC FUNCTION PANEL: CPT

## 2019-06-05 PROCEDURE — 71260 CT THORAX DX C+: CPT

## 2019-06-05 PROCEDURE — 80061 LIPID PANEL: CPT

## 2019-06-05 PROCEDURE — 99285 EMERGENCY DEPT VISIT HI MDM: CPT | Mod: 25

## 2019-06-05 PROCEDURE — 83605 ASSAY OF LACTIC ACID: CPT

## 2019-06-05 PROCEDURE — 84295 ASSAY OF SERUM SODIUM: CPT

## 2019-06-05 PROCEDURE — 81001 URINALYSIS AUTO W/SCOPE: CPT

## 2019-06-05 PROCEDURE — 87581 M.PNEUMON DNA AMP PROBE: CPT

## 2019-06-05 PROCEDURE — 74177 CT ABD & PELVIS W/CONTRAST: CPT

## 2019-06-05 PROCEDURE — 97167 OT EVAL HIGH COMPLEX 60 MIN: CPT

## 2019-06-05 PROCEDURE — 82435 ASSAY OF BLOOD CHLORIDE: CPT

## 2019-06-05 PROCEDURE — 82550 ASSAY OF CK (CPK): CPT

## 2019-06-05 PROCEDURE — 87040 BLOOD CULTURE FOR BACTERIA: CPT

## 2019-06-05 PROCEDURE — 85730 THROMBOPLASTIN TIME PARTIAL: CPT

## 2019-06-05 PROCEDURE — 87798 DETECT AGENT NOS DNA AMP: CPT

## 2019-06-05 PROCEDURE — 83036 HEMOGLOBIN GLYCOSYLATED A1C: CPT

## 2019-06-05 PROCEDURE — 86901 BLOOD TYPING SEROLOGIC RH(D): CPT

## 2019-06-05 PROCEDURE — 84132 ASSAY OF SERUM POTASSIUM: CPT

## 2019-06-05 PROCEDURE — 80074 ACUTE HEPATITIS PANEL: CPT

## 2019-06-05 PROCEDURE — 85027 COMPLETE CBC AUTOMATED: CPT

## 2019-06-05 PROCEDURE — 80053 COMPREHEN METABOLIC PANEL: CPT

## 2019-06-05 PROCEDURE — 82947 ASSAY GLUCOSE BLOOD QUANT: CPT

## 2019-06-05 PROCEDURE — 82553 CREATINE MB FRACTION: CPT

## 2019-06-05 PROCEDURE — 92610 EVALUATE SWALLOWING FUNCTION: CPT

## 2019-06-05 PROCEDURE — 84100 ASSAY OF PHOSPHORUS: CPT

## 2019-06-05 PROCEDURE — 70450 CT HEAD/BRAIN W/O DYE: CPT

## 2019-06-05 PROCEDURE — 82803 BLOOD GASES ANY COMBINATION: CPT

## 2019-06-05 PROCEDURE — 87633 RESP VIRUS 12-25 TARGETS: CPT

## 2019-06-05 PROCEDURE — 82962 GLUCOSE BLOOD TEST: CPT

## 2019-06-05 PROCEDURE — 85014 HEMATOCRIT: CPT

## 2019-06-05 PROCEDURE — 82330 ASSAY OF CALCIUM: CPT

## 2019-06-05 PROCEDURE — 94002 VENT MGMT INPAT INIT DAY: CPT

## 2019-06-05 PROCEDURE — 71045 X-RAY EXAM CHEST 1 VIEW: CPT

## 2019-06-05 PROCEDURE — 97535 SELF CARE MNGMENT TRAINING: CPT

## 2019-06-05 PROCEDURE — 85610 PROTHROMBIN TIME: CPT

## 2019-06-05 PROCEDURE — 70551 MRI BRAIN STEM W/O DYE: CPT

## 2019-06-05 PROCEDURE — 93005 ELECTROCARDIOGRAM TRACING: CPT

## 2019-06-05 PROCEDURE — 0042T: CPT

## 2019-06-05 PROCEDURE — 80048 BASIC METABOLIC PNL TOTAL CA: CPT

## 2019-06-05 PROCEDURE — 96374 THER/PROPH/DIAG INJ IV PUSH: CPT | Mod: XU

## 2019-06-05 PROCEDURE — 87486 CHLMYD PNEUM DNA AMP PROBE: CPT

## 2019-06-05 PROCEDURE — 86850 RBC ANTIBODY SCREEN: CPT

## 2019-06-05 PROCEDURE — 86803 HEPATITIS C AB TEST: CPT

## 2019-06-05 PROCEDURE — 97162 PT EVAL MOD COMPLEX 30 MIN: CPT

## 2019-06-05 PROCEDURE — 97530 THERAPEUTIC ACTIVITIES: CPT

## 2019-06-05 PROCEDURE — 94003 VENT MGMT INPAT SUBQ DAY: CPT

## 2019-06-05 PROCEDURE — 84484 ASSAY OF TROPONIN QUANT: CPT

## 2019-06-05 PROCEDURE — 86900 BLOOD TYPING SEROLOGIC ABO: CPT

## 2019-06-05 PROCEDURE — 83735 ASSAY OF MAGNESIUM: CPT

## 2019-06-05 NOTE — DISCHARGE NOTE FOR THE EXPIRED PATIENT - SECONDARY DIAGNOSIS.
Acute respiratory failure with hypoxia and hypercapnia Atrial fibrillation Dysphagia following cerebrovascular accident

## 2019-06-05 NOTE — PROVIDER CONTACT NOTE (OTHER) - ASSESSMENT
No response to external stimuli, no spontaneous respirations, no apical heart rate, Negative papillary response to stimuli.

## 2019-06-05 NOTE — DISCHARGE NOTE FOR THE EXPIRED PATIENT - HOSPITAL COURSE
73 Y/O hx of afib on xaralto, admitted to [facility] with slurred speech and ams,  [facility] found to have right proximal M1 occlusion. Patient had CT head and CT perfusion done at [facility], which showed early signs of RMCA infarction and core infarct. Repeat CTH  with evolving right basal ganglia area of hemorrhagic transformation with large right MCA infarction with noted worsening mass effect and leftward shift, called for goals of care, no further medical interventions,  large MCA infarct with mass effect and leftward shift. Family requested comfort measures only. s/P compassionate extubation 6/4. Patients wishes were shared via his family that a life of dependency was not consistent with  quality of life and therefore a full comfort approach was initiated.  Pt was pronounced dead on 6/5, family and friends at bedside.

## 2019-06-06 LAB
CULTURE RESULTS: SIGNIFICANT CHANGE UP
CULTURE RESULTS: SIGNIFICANT CHANGE UP
SPECIMEN SOURCE: SIGNIFICANT CHANGE UP
SPECIMEN SOURCE: SIGNIFICANT CHANGE UP

## 2020-02-10 NOTE — CONSULT NOTE ADULT - ATTENDING COMMENTS
November 1, 2019        Leta Neha  [address], [place] [address]      Dear Ms. Solomon:    My staff attempted to reach you with no success on November 1, 2019 at [phone] regarding your appointment with Vazquez Haque, Genetics Counselor that is scheduled on Tuesday, November, 5, 2019 at 2:15pm.  We are sending you this letter to inform you that we can no longer accommodate that date and time.      Please call us at [phone] as soon as possible to reschedule your appointment.  We appreciate the opportunity to continue to participate in your care.        Sincerely,        Signed electronically by  Lissa Lutz, CAROLINE  Director, [facility]   [facility]    
74M with stroke with fever, leukocytosis, elevated t. bili, SIRS  -differential include cholecystitis, aspiration PNA, central fever from stroke  -Unasyn 3 gm iv q6  -dc cefuroxime/flagyl  -trend LFTs  -check blood cx  -check USG abdomen    Donald Varghese  Attending Physician   Division of Infectious Disease  Pager #[phone]  After 5pm/weekend or no response, call #[phone]    Please call the ID service [phone] with questions or concerns over the weekend.
Advanced care planning was discussed with patient and family.  Advanced care planning forms were reviewed and discussed.  Risks, benefits and alternatives of gastroenterologic procedures were discussed in detail and all questions were answered.    30 minutes spent.

## 2020-08-12 NOTE — OCCUPATIONAL THERAPY INITIAL EVALUATION ADULT - BED MOBILITY TRAINING, PT EVAL
GOAL: Patient will perform bed mobility with min A in 4 weeks Cimetidine Counseling:  I discussed with the patient the risks of Cimetidine including but not limited to gynecomastia, headache, diarrhea, nausea, drowsiness, arrhythmias, pancreatitis, skin rashes, psychosis, bone marrow suppression and kidney toxicity.

## 2021-03-09 NOTE — PROVIDER CONTACT NOTE (EICU) - BACKGROUND
Report given to CONRAD CALVERT [facility] RN  Patient awaiting transfer     Deni Zheng RN  03/08/21 8248 patient comfort care, q12 neuro and vital sign checks

## 2021-05-03 NOTE — ED PROVIDER NOTE - MDM ORDERS SUBMITTED SELECTION
Graft Donor Site Bandage (Optional-Leave Blank If You Don't Want In Note): Steri-strips and a pressure bandage were applied to the donor site. Labs

## 2021-09-20 NOTE — PHYSICAL THERAPY INITIAL EVALUATION ADULT - GAIT DEVIATIONS NOTED, PT EVAL
decreased hawa/decreased velocity of limb motion/decreased weight-shifting ability/decreased step length/L knee blocked
[facility]

## 2023-03-01 NOTE — ED PROVIDER NOTE - CLINICAL SUMMARY MEDICAL DECISION MAKING FREE TEXT BOX
Previously Declined (within the last year) 74 yr old male with hx of afib on xarelto, HTN, HLD, DM, kidneys tone of left x 3 requiring lithotripsy/stent presents to ed c/o left flank pain  worsening x 1 day with hematuria and dysuria.  dx with stone 1 month ago and told 1cm  stone.  no fever, no n/v, no cp. pt feels abd fullness. took motirn at 7p.    left flank pain with confirmed 1 cm likely causing afib rvr r/o renal failure less likely infected stone will need uro after ct, labs, fluids, and re-assess. morphine for pain

## 2025-04-09 NOTE — SWALLOW BEDSIDE ASSESSMENT ADULT - SWALLOW EVAL: MANDIBULAR STRENGTH AND MOBILITY
Follow up call made to patient's mother to check on status of scheduling ultrasounds. She took down the phone number to call back our referral specialist - writer encouraged her to follow up. Will check chart again in one week to see if scheduled.    impaired